# Patient Record
Sex: FEMALE | Race: WHITE | Employment: FULL TIME | URBAN - METROPOLITAN AREA
[De-identification: names, ages, dates, MRNs, and addresses within clinical notes are randomized per-mention and may not be internally consistent; named-entity substitution may affect disease eponyms.]

---

## 2017-01-18 ENCOUNTER — OFFICE VISIT (OUTPATIENT)
Dept: OBGYN CLINIC | Facility: CLINIC | Age: 37
End: 2017-01-18

## 2017-01-18 ENCOUNTER — LAB ENCOUNTER (OUTPATIENT)
Dept: LAB | Age: 37
End: 2017-01-18
Attending: OBSTETRICS & GYNECOLOGY
Payer: COMMERCIAL

## 2017-01-18 VITALS — HEART RATE: 66 BPM | DIASTOLIC BLOOD PRESSURE: 74 MMHG | SYSTOLIC BLOOD PRESSURE: 114 MMHG

## 2017-01-18 DIAGNOSIS — R30.0 DYSURIA: ICD-10-CM

## 2017-01-18 DIAGNOSIS — R30.0 DYSURIA: Primary | ICD-10-CM

## 2017-01-18 LAB
BILIRUB UR QL: NEGATIVE
COLOR UR: YELLOW
GLUCOSE UR-MCNC: NEGATIVE MG/DL
HGB UR QL STRIP.AUTO: NEGATIVE
KETONES UR-MCNC: NEGATIVE MG/DL
NITRITE UR QL STRIP.AUTO: POSITIVE
PH UR: 7 [PH] (ref 5–8)
PROT UR-MCNC: NEGATIVE MG/DL
RBC #/AREA URNS AUTO: 6 /HPF
SP GR UR STRIP: 1.01 (ref 1–1.03)
UROBILINOGEN UR STRIP-ACNC: <2
VIT C UR-MCNC: 40 MG/DL
WBC #/AREA URNS AUTO: 42 /HPF

## 2017-01-18 PROCEDURE — 87077 CULTURE AEROBIC IDENTIFY: CPT

## 2017-01-18 PROCEDURE — 87186 SC STD MICRODIL/AGAR DIL: CPT

## 2017-01-18 PROCEDURE — 81001 URINALYSIS AUTO W/SCOPE: CPT

## 2017-01-18 PROCEDURE — 87086 URINE CULTURE/COLONY COUNT: CPT

## 2017-01-18 PROCEDURE — 99213 OFFICE O/P EST LOW 20 MIN: CPT | Performed by: OBSTETRICS & GYNECOLOGY

## 2017-01-18 NOTE — PROGRESS NOTES
Mery Irvin is a 39year old female  No LMP recorded. Patient is not currently having periods (Reason: IUD - Intrauterine Device). Patient presents with:  Gyn Problem: UTI  Patient presents today with symptoms of UTI.  She states she has had these s Control/ Protection: Mirena     Other Topics Concern    Caffeine Concern Yes    Comment: coffee 1 cup daily     Social History Narrative       MEDICATIONS:    Current outpatient prescriptions:   •  cetirizine 10 MG Oral Tab, Take 10 mg by mouth daily. , Dis

## 2017-01-19 ENCOUNTER — TELEPHONE (OUTPATIENT)
Dept: OBGYN CLINIC | Facility: CLINIC | Age: 37
End: 2017-01-19

## 2017-01-20 RX ORDER — NITROFURANTOIN 25; 75 MG/1; MG/1
100 CAPSULE ORAL 2 TIMES DAILY
Qty: 14 CAPSULE | Refills: 0 | Status: SHIPPED | OUTPATIENT
Start: 2017-01-20 | End: 2017-01-27

## 2017-01-20 NOTE — TELEPHONE ENCOUNTER
Pt informed of KCBs recs below and verbalized understanding. Pt instructed to call office if sx's persist after completing tx. Pt verbalized understanding. Pt instructed to push fluids as well.

## 2017-01-20 NOTE — TELEPHONE ENCOUNTER
Please let patient know that she has e. Coli uti and will need treatment. Please send eRx for Macrobid 100mg BID for 7 days.

## 2017-01-21 ENCOUNTER — HOSPITAL ENCOUNTER (OUTPATIENT)
Age: 37
Discharge: HOME | End: 2017-01-21
Attending: FAMILY MEDICINE
Payer: COMMERCIAL

## 2017-01-21 VITALS
HEART RATE: 94 BPM | HEIGHT: 63 IN | SYSTOLIC BLOOD PRESSURE: 117 MMHG | DIASTOLIC BLOOD PRESSURE: 82 MMHG | WEIGHT: 140 LBS | RESPIRATION RATE: 16 BRPM | TEMPERATURE: 98 F | OXYGEN SATURATION: 96 % | BODY MASS INDEX: 24.8 KG/M2

## 2017-01-21 DIAGNOSIS — J01.00 ACUTE NON-RECURRENT MAXILLARY SINUSITIS: Primary | ICD-10-CM

## 2017-01-21 PROCEDURE — 99204 OFFICE O/P NEW MOD 45 MIN: CPT

## 2017-01-21 PROCEDURE — 99213 OFFICE O/P EST LOW 20 MIN: CPT

## 2017-01-21 RX ORDER — AMOXICILLIN AND CLAVULANATE POTASSIUM 875; 125 MG/1; MG/1
1 TABLET, FILM COATED ORAL 2 TIMES DAILY
Qty: 20 TABLET | Refills: 0 | Status: SHIPPED | OUTPATIENT
Start: 2017-01-21 | End: 2017-01-31

## 2017-01-21 NOTE — ED PROVIDER NOTES
Patient Seen in: [facility] In [address] [place] [address]    History   Patient presents with:  Cough/URI  Ear Problem Pain (neurosensory)    Stated Complaint: ear pain    HPI    Patient here with nasal congestion and cough for about 5 days .     Does ha occ      Review of Systems    Positive for stated complaint: ear pain  Other systems are as noted in HPI. Constitutional and vital signs reviewed. All other systems reviewed and negative except as noted above.     PSFH elements reviewed from today and to the ER.       Disposition and Plan     Clinical Impression:  Acute non-recurrent maxillary sinusitis  (primary encounter diagnosis)    Disposition:  Discharge    Follow-up:  Lisette Byrne DO  [id number] [phone]

## 2018-02-05 ENCOUNTER — OFFICE VISIT (OUTPATIENT)
Dept: OBGYN CLINIC | Facility: CLINIC | Age: 38
End: 2018-02-05

## 2018-02-05 VITALS
DIASTOLIC BLOOD PRESSURE: 80 MMHG | BODY MASS INDEX: 24 KG/M2 | HEART RATE: 72 BPM | SYSTOLIC BLOOD PRESSURE: 121 MMHG | WEIGHT: 137.63 LBS

## 2018-02-05 DIAGNOSIS — Z01.419 ENCOUNTER FOR GYNECOLOGICAL EXAMINATION: Primary | ICD-10-CM

## 2018-02-05 PROCEDURE — 99395 PREV VISIT EST AGE 18-39: CPT | Performed by: OBSTETRICS & GYNECOLOGY

## 2018-02-05 NOTE — PROGRESS NOTES
Faby Sheets is a 44year old female  No LMP recorded. Patient is not currently having periods (Reason: IUD - Intrauterine Device). here for annual exam.       Last seen  with pregnancy. Had Mirena IUD placed in 2014 with [facility].  No periods. denies blurred or double vision  Cardiovascular:  denies chest pain or palpitations  Respiratory:  denies shortness of breath  Gastrointestinal:  denies heartburn, abdominal pain, diarrhea or constipation  Genitourinary:  denies dysuria, incontinence, abno gynecological examination  Pap not done. ASCCP guidelines reviewed.    Encouraged annual exam.  RTC 1 year or prn      No prescriptions requested or ordered in this encounter      Irina Roper MD  2/5/2018  4:52 PM

## 2018-06-22 ENCOUNTER — TELEPHONE (OUTPATIENT)
Dept: OBGYN CLINIC | Facility: CLINIC | Age: 38
End: 2018-06-22

## 2018-06-22 RX ORDER — SULFAMETHOXAZOLE AND TRIMETHOPRIM 800; 160 MG/1; MG/1
1 TABLET ORAL 2 TIMES DAILY
Qty: 6 TABLET | Refills: 0 | Status: SHIPPED | OUTPATIENT
Start: 2018-06-22 | End: 2018-07-02

## 2018-06-22 NOTE — TELEPHONE ENCOUNTER
ON CALL--  Spoke with pt. In [place]. Burning with urination and increased frequency for a couple of days. Very uncomfortable.    Had UTI in [place]  Bactrim DS bid x 3 days efaxed to [facility] in [address], [place]

## 2018-10-22 ENCOUNTER — OFFICE VISIT (OUTPATIENT)
Dept: FAMILY MEDICINE CLINIC | Facility: CLINIC | Age: 38
End: 2018-10-22

## 2018-10-22 ENCOUNTER — NURSE TRIAGE (OUTPATIENT)
Dept: OTHER | Age: 38
End: 2018-10-22

## 2018-10-22 VITALS
BODY MASS INDEX: 21.79 KG/M2 | TEMPERATURE: 98 F | RESPIRATION RATE: 15 BRPM | SYSTOLIC BLOOD PRESSURE: 127 MMHG | WEIGHT: 123 LBS | HEART RATE: 67 BPM | DIASTOLIC BLOOD PRESSURE: 86 MMHG | HEIGHT: 63 IN

## 2018-10-22 DIAGNOSIS — J06.9 URI, ACUTE: Primary | ICD-10-CM

## 2018-10-22 DIAGNOSIS — R05.9 COUGH: ICD-10-CM

## 2018-10-22 PROCEDURE — 99212 OFFICE O/P EST SF 10 MIN: CPT | Performed by: FAMILY MEDICINE

## 2018-10-22 PROCEDURE — 99213 OFFICE O/P EST LOW 20 MIN: CPT | Performed by: FAMILY MEDICINE

## 2018-10-22 RX ORDER — FLUTICASONE PROPIONATE AND SALMETEROL 100; 50 UG/1; UG/1
1 POWDER RESPIRATORY (INHALATION) 2 TIMES DAILY
Qty: 1 PACKAGE | Refills: 1 | Status: SHIPPED | OUTPATIENT
Start: 2018-10-22 | End: 2019-10-17

## 2018-10-22 NOTE — TELEPHONE ENCOUNTER
Action Requested: Summary for Provider     []  Critical Lab, Recommendations Needed  [] Need Additional Advice  [x]   FYI    []   Need Orders  [] Need Medications Sent to Pharmacy  []  Other     SUMMARY: dry cough, runny/stuffy nose, and headache for over

## 2018-10-22 NOTE — PROGRESS NOTES
HPI:   Mirna Roldan is a 43year old female who presents for upper respiratory symptoms for  1  weeks. dry cough, runny/stuffy nose, and headache for over 1 week. Chest hurts when coughing. No fevers. Not short of breath or wheezing.  No other symptoms   T (36.7 °C) (Oral)   Resp 15   Ht 5' 3\" (1.6 m)   Wt 123 lb (55.8 kg)   BMI 21.79 kg/m²   GENERAL: well developed, well nourished,in no apparent distress  SKIN: no rashes  EYES:conjunctiva are clear  HEENT: atraumatic, normocephalic,ears and throat are gerardo

## 2018-11-14 ENCOUNTER — TELEPHONE (OUTPATIENT)
Dept: OBGYN CLINIC | Facility: CLINIC | Age: 38
End: 2018-11-14

## 2018-11-14 ENCOUNTER — OFFICE VISIT (OUTPATIENT)
Dept: OBGYN CLINIC | Facility: CLINIC | Age: 38
End: 2018-11-14

## 2018-11-14 VITALS — DIASTOLIC BLOOD PRESSURE: 82 MMHG | HEART RATE: 62 BPM | SYSTOLIC BLOOD PRESSURE: 127 MMHG

## 2018-11-14 DIAGNOSIS — N90.89 VULVAR IRRITATION: Primary | ICD-10-CM

## 2018-11-14 PROCEDURE — 99213 OFFICE O/P EST LOW 20 MIN: CPT | Performed by: OBSTETRICS & GYNECOLOGY

## 2018-11-14 RX ORDER — NYSTATIN AND TRIAMCINOLONE ACETONIDE 100000; 1 [USP'U]/G; MG/G
1 OINTMENT TOPICAL 2 TIMES DAILY
Qty: 1 TUBE | Refills: 0 | Status: SHIPPED | OUTPATIENT
Start: 2018-11-14 | End: 2021-06-28

## 2018-11-14 NOTE — TELEPHONE ENCOUNTER
PER PT STATE THE MEDICATION THAT WAS PRESCRIBE FOR HER IS NOT COVERED THRU HER INSURANCE / PT WANT TO KNOW IF THERE'S AN ALTERNATIVE MEDICATION / PLS ADV

## 2018-11-14 NOTE — TELEPHONE ENCOUNTER
Message to [facility] to see if there is an alternative for nystatin triamcinolone.

## 2018-11-14 NOTE — PROGRESS NOTES
Irene Mckeon is a 43year old female  No LMP recorded. Patient is not currently having periods (Reason: IUD - Intrauterine Device). Patient presents with:  Gyn Problem: VAGINAL IRRITATION    Patient presents today with vulvar irritation.  She states since quittin.4      Smokeless tobacco: Never Used      Tobacco comment: quit 2006    Substance and Sexual Activity      Alcohol use: Yes        Comment: OCC      Drug use: No      Sexual activity: Yes        Partners: Male        Birth control/protec Urethral Meatus:  normal in size, location, without lesions and prolapse  Bladder:  No fullness, masses or tenderness  Vagina:  Normal appearance without lesions, no abnormal discharge  Cervix:  Normal without lesions  Perineum: normal      Assessment &

## 2018-11-15 NOTE — TELEPHONE ENCOUNTER
Patient calling stating pharmacy has not gotten alternative patient would like to get response before end of day

## 2018-11-15 NOTE — TELEPHONE ENCOUNTER
KCB pt. Pharmacist stating that Nystatin-Triamcinolone 402376 - 0.1 uni/GM % external not covered. Pharmacist stated that Nystatin  And Triamcinolone separately would be covered. Discussed with KHUSHI and stated ok to send separately.  Placed a rx with

## 2019-08-20 ENCOUNTER — TELEPHONE (OUTPATIENT)
Dept: OBGYN CLINIC | Facility: CLINIC | Age: 39
End: 2019-08-20

## 2019-08-20 NOTE — TELEPHONE ENCOUNTER
Per [facility] office visit notes from pts last annual in 2/2018, \"Had Mirena IUD placed in 11/2014 with KMD\". Pt informed she is due for IUD exchange this November.  Pt also informed that she is overdue for an annual exam and this will need to be done prior to

## 2019-10-09 ENCOUNTER — OFFICE VISIT (OUTPATIENT)
Dept: OBGYN CLINIC | Facility: CLINIC | Age: 39
End: 2019-10-09

## 2019-10-09 VITALS
BODY MASS INDEX: 21.87 KG/M2 | DIASTOLIC BLOOD PRESSURE: 75 MMHG | HEIGHT: 63.5 IN | SYSTOLIC BLOOD PRESSURE: 114 MMHG | HEART RATE: 70 BPM | WEIGHT: 125 LBS

## 2019-10-09 DIAGNOSIS — Z12.31 ENCOUNTER FOR SCREENING MAMMOGRAM FOR BREAST CANCER: ICD-10-CM

## 2019-10-09 DIAGNOSIS — Z12.4 CERVICAL CANCER SCREENING: ICD-10-CM

## 2019-10-09 DIAGNOSIS — Z01.419 ENCOUNTER FOR GYNECOLOGICAL EXAMINATION WITHOUT ABNORMAL FINDING: Primary | ICD-10-CM

## 2019-10-09 DIAGNOSIS — Z30.431 IUD CHECK UP: ICD-10-CM

## 2019-10-09 PROCEDURE — 99396 PREV VISIT EST AGE 40-64: CPT | Performed by: OBSTETRICS & GYNECOLOGY

## 2019-10-09 NOTE — PROGRESS NOTES
Well Woman Exam    HPI:  The patient is a 43yo female who presents today for annual exam. She has no complaints. She is doing well. She has no menses with Mirena IUD that is due out Nov 2019. Pt would like another Mirena IUD.  She is 39 and reviewed need fo Substance and Sexual Activity      Alcohol use: Yes        Comment: OCC      Drug use: No      Sexual activity: Yes        Partners: Male        Birth control/protection: Mirena, IUD    Lifestyle      Physical activity:        Days per week: Not on file Disp: 1 Tube, Rfl: 0  •  fluticasone-salmeterol (ADVAIR DISKUS) 100-50 MCG/DOSE Inhalation Aerosol Powder, Breath Activated, Inhale 1 puff into the lungs 2 (two) times daily. , Disp: 1 Package, Rfl: 1  •  cetirizine 10 MG Oral Tab, Take 10 mg by mouth daily tenderness  Perineum: normal    Assessment/Plan:  1. WWE:   1. Reviewed ASCCP guidelines with the patient   2. Cotesting today  3. Negative cotesting in 2016  2. Contraception:   1. Mirena IUD  2.  Plan for removal and reinsertion as expires in Nov 2019   3

## 2019-11-06 ENCOUNTER — HOSPITAL ENCOUNTER (OUTPATIENT)
Dept: MAMMOGRAPHY | Age: 39
Discharge: HOME | End: 2019-11-06
Attending: OBSTETRICS & GYNECOLOGY
Payer: COMMERCIAL

## 2019-11-06 DIAGNOSIS — Z12.31 ENCOUNTER FOR SCREENING MAMMOGRAM FOR BREAST CANCER: ICD-10-CM

## 2019-11-06 PROCEDURE — 77063 BREAST TOMOSYNTHESIS BI: CPT | Performed by: OBSTETRICS & GYNECOLOGY

## 2019-11-06 PROCEDURE — 77067 SCR MAMMO BI INCL CAD: CPT | Performed by: OBSTETRICS & GYNECOLOGY

## 2019-11-11 ENCOUNTER — OFFICE VISIT (OUTPATIENT)
Dept: OBGYN CLINIC | Facility: CLINIC | Age: 39
End: 2019-11-11

## 2019-11-11 VITALS
BODY MASS INDEX: 22 KG/M2 | SYSTOLIC BLOOD PRESSURE: 120 MMHG | WEIGHT: 125.63 LBS | HEART RATE: 66 BPM | DIASTOLIC BLOOD PRESSURE: 79 MMHG

## 2019-11-11 DIAGNOSIS — Z32.00 PREGNANCY EXAMINATION OR TEST, PREGNANCY UNCONFIRMED: Primary | ICD-10-CM

## 2019-11-11 DIAGNOSIS — Z30.433 ENCOUNTER FOR REMOVAL AND REINSERTION OF INTRAUTERINE CONTRACEPTIVE DEVICE: ICD-10-CM

## 2019-11-11 PROCEDURE — 58300 INSERT INTRAUTERINE DEVICE: CPT | Performed by: OBSTETRICS & GYNECOLOGY

## 2019-11-11 PROCEDURE — 81025 URINE PREGNANCY TEST: CPT | Performed by: OBSTETRICS & GYNECOLOGY

## 2019-11-11 NOTE — PROCEDURES
IUD Removal and Insertion      Pregnancy Results: negative from urine test   Birth control method(s) used: Mirena  LMP: None  Consent signed.   Procedure discussed with the patient in detail including indication, risks, benefits, alternatives and complicati

## 2019-12-09 ENCOUNTER — TELEPHONE (OUTPATIENT)
Dept: OBGYN CLINIC | Facility: CLINIC | Age: 39
End: 2019-12-09

## 2019-12-09 NOTE — TELEPHONE ENCOUNTER
Pt had IUD insertion about 4 weeks ago, this past week pt having severe abd pain and lower back pain. Has f/u appt on 12/18 but not sure if she should be seen sooner or if that's the reason for the pain.  pls adv further

## 2019-12-09 NOTE — TELEPHONE ENCOUNTER
C/O HAD MIRENA IUD PLACED ON 11-11-19. STATES SHE HAD SOME CRAMPING FOR THE FIRST WEEK THEN DID RESOLVE COMPLETELY.   NOW FOR PAST COUPLE WEEKS IS HAVING LOW ABDOMINAL PAIN (ALL ACROSS BOTTOM OF ABDOMEN) AND LOW BACK PAIN THAT STARTS IN THE EVENING AND RIO

## 2019-12-10 NOTE — TELEPHONE ENCOUNTER
Recommend Ibuprofen 600mg q6 hours as needed and heating pad. If pain is severe can go to ER. Cramping is often more sharp with IUD in place.  I will see pt on 12/18 or if I have an opening (20 minutes) can add her earlier

## 2019-12-10 NOTE — TELEPHONE ENCOUNTER
Informed pt of KCB recs below. Offered pt first available 20 min appt for 12/17/19 pt declined due to appt is only 1 day sooner then appt scheduled. Pt added to [facility] wait list for 20 min appt. Pt verbalized understanding.

## 2019-12-10 NOTE — TELEPHONE ENCOUNTER
Pt calling to f/u about message below. Informed pt Aristides Avila has not had a chance to respond yet as pt called after KCB left and Aristides Avila is not in clinic until this afternoon.  Pt reports after she spoke to nurse yesterday she again had constant lower abdominal pain

## 2019-12-18 ENCOUNTER — OFFICE VISIT (OUTPATIENT)
Dept: OBGYN CLINIC | Facility: CLINIC | Age: 39
End: 2019-12-18

## 2019-12-18 VITALS
WEIGHT: 122 LBS | SYSTOLIC BLOOD PRESSURE: 135 MMHG | DIASTOLIC BLOOD PRESSURE: 86 MMHG | BODY MASS INDEX: 21 KG/M2 | HEART RATE: 62 BPM

## 2019-12-18 DIAGNOSIS — R10.2 PELVIC PAIN IN FEMALE: Primary | ICD-10-CM

## 2019-12-18 DIAGNOSIS — Z30.431 IUD CHECK UP: ICD-10-CM

## 2019-12-18 PROCEDURE — 99213 OFFICE O/P EST LOW 20 MIN: CPT | Performed by: OBSTETRICS & GYNECOLOGY

## 2019-12-18 NOTE — PROGRESS NOTES
Erwin Reed is a 35year old female  No LMP recorded. (Menstrual status: IUD - Intrauterine Device). Patient presents with:  Gyn Exam: IUD check     The patient is a 41yo female who presents after Mirena IUD insertion on .  She states she wa Used      Tobacco comment: quit 2006    Substance and Sexual Activity      Alcohol use: Yes        Comment: OCC      Drug use: No      Sexual activity: Yes        Partners: Male        Birth control/protection: Mirena, I.U.D.     Lifestyle      Physical act breath  Gastrointestinal:  denies nausea, vomiting, diarrhea   Genitourinary:  denies dysuria, incontinence  Heme: Denies easy bruising   Skin/Breast:  Denies any breast pain, lumps, or discharge.    Neurological:  denies headaches, blurred or double vision Call if pain continues. Will call after US results are returned.

## 2020-01-08 ENCOUNTER — HOSPITAL ENCOUNTER (OUTPATIENT)
Dept: ULTRASOUND IMAGING | Age: 40
Discharge: HOME | End: 2020-01-08
Attending: OBSTETRICS & GYNECOLOGY
Payer: COMMERCIAL

## 2020-01-08 DIAGNOSIS — R10.2 PELVIC PAIN IN FEMALE: ICD-10-CM

## 2020-01-08 DIAGNOSIS — Z30.431 IUD CHECK UP: ICD-10-CM

## 2020-01-08 PROCEDURE — 76830 TRANSVAGINAL US NON-OB: CPT | Performed by: OBSTETRICS & GYNECOLOGY

## 2020-01-08 PROCEDURE — 76856 US EXAM PELVIC COMPLETE: CPT | Performed by: OBSTETRICS & GYNECOLOGY

## 2020-01-16 ENCOUNTER — TELEPHONE (OUTPATIENT)
Dept: OBGYN CLINIC | Facility: CLINIC | Age: 40
End: 2020-01-16

## 2020-01-16 DIAGNOSIS — N83.201 RIGHT OVARIAN CYST: Primary | ICD-10-CM

## 2020-01-16 NOTE — TELEPHONE ENCOUNTER
Pt advised of below. States does not receive cycles. Informed will clarify with KCB on when to repeat US and let her know. States understanding.

## 2020-01-16 NOTE — TELEPHONE ENCOUNTER
----- Message from Dylan Titus MD sent at 1/15/2020  1:45 PM CST -----  Please let patient know that [address] shows IUD in place. She had a very small 2cm right ovarian cyst. I recommend we repeat and US in 8 weeks after her next menses (if she gets one).  P

## 2020-01-20 NOTE — TELEPHONE ENCOUNTER
Informed pt that [address] stated 8 weeks from last [address]. Pt stated understanding. Pt give phone number to schedule it.

## 2020-03-05 ENCOUNTER — HOSPITAL ENCOUNTER (OUTPATIENT)
Dept: ULTRASOUND IMAGING | Age: 40
Discharge: HOME | End: 2020-03-05
Attending: OBSTETRICS & GYNECOLOGY
Payer: COMMERCIAL

## 2020-03-05 DIAGNOSIS — N83.201 RIGHT OVARIAN CYST: ICD-10-CM

## 2020-03-05 PROCEDURE — 76830 TRANSVAGINAL US NON-OB: CPT | Performed by: OBSTETRICS & GYNECOLOGY

## 2020-03-05 PROCEDURE — 76856 US EXAM PELVIC COMPLETE: CPT | Performed by: OBSTETRICS & GYNECOLOGY

## 2020-03-10 ENCOUNTER — TELEPHONE (OUTPATIENT)
Dept: OBGYN CLINIC | Facility: CLINIC | Age: 40
End: 2020-03-10

## 2020-03-10 NOTE — TELEPHONE ENCOUNTER
Left message for patient to go over ultrasound results and position of IUD. Pt to call back and review recommendations and risks with IUD in lower uterine segment.

## 2020-03-13 NOTE — TELEPHONE ENCOUNTER
Returned phone call. Pt doing well and no pain. Reviewed US findings. Reviewed IUD is lower in the uterus. Reviewed options of removal, removing it and replacing it, or leaving it in place.  Reviewed Up to date data with her and that it states it is ok for

## 2021-01-19 ENCOUNTER — NURSE TRIAGE (OUTPATIENT)
Dept: FAMILY MEDICINE CLINIC | Facility: CLINIC | Age: 41
End: 2021-01-19

## 2021-01-19 ENCOUNTER — TELEMEDICINE (OUTPATIENT)
Dept: FAMILY MEDICINE CLINIC | Facility: CLINIC | Age: 41
End: 2021-01-19

## 2021-01-19 DIAGNOSIS — Z12.31 SCREENING MAMMOGRAM, ENCOUNTER FOR: Primary | ICD-10-CM

## 2021-01-19 PROCEDURE — 99213 OFFICE O/P EST LOW 20 MIN: CPT | Performed by: FAMILY MEDICINE

## 2021-01-19 RX ORDER — AMOXICILLIN 500 MG/1
500 CAPSULE ORAL 3 TIMES DAILY
Qty: 30 CAPSULE | Refills: 0 | Status: SHIPPED | OUTPATIENT
Start: 2021-01-19 | End: 2021-01-29

## 2021-01-19 NOTE — PROGRESS NOTES
VIDEO VISIT    Patient presents today complaining of a 1 week history of toothache pressure and left ear pain. Also clear nasal congestion. Advil for ear pain. The ear pain did keep her awake last night denies fever no anosmia no ageusia.   She works fro

## 2021-01-19 NOTE — TELEPHONE ENCOUNTER
Action Requested: Summary for Provider     []  Critical Lab, Recommendations Needed  [] Need Additional Advice  []   FYI    []   Need Orders  [] Need Medications Sent to Pharmacy  []  Other     SUMMARY: Doximity scheduled with PCP today.      Reason for munira

## 2021-02-08 ENCOUNTER — OFFICE VISIT (OUTPATIENT)
Dept: FAMILY MEDICINE CLINIC | Facility: CLINIC | Age: 41
End: 2021-02-08

## 2021-02-08 VITALS
WEIGHT: 143.19 LBS | HEART RATE: 62 BPM | BODY MASS INDEX: 25.37 KG/M2 | DIASTOLIC BLOOD PRESSURE: 77 MMHG | SYSTOLIC BLOOD PRESSURE: 117 MMHG | HEIGHT: 63 IN

## 2021-02-08 DIAGNOSIS — S03.40XA SPRAIN OF TEMPOROMANDIBULAR JOINT, INITIAL ENCOUNTER: Primary | ICD-10-CM

## 2021-02-08 PROCEDURE — 3008F BODY MASS INDEX DOCD: CPT | Performed by: FAMILY MEDICINE

## 2021-02-08 PROCEDURE — 3074F SYST BP LT 130 MM HG: CPT | Performed by: FAMILY MEDICINE

## 2021-02-08 PROCEDURE — 99213 OFFICE O/P EST LOW 20 MIN: CPT | Performed by: FAMILY MEDICINE

## 2021-02-08 PROCEDURE — 3078F DIAST BP <80 MM HG: CPT | Performed by: FAMILY MEDICINE

## 2021-02-08 RX ORDER — FLUTICASONE PROPIONATE 50 MCG
2 SPRAY, SUSPENSION (ML) NASAL DAILY
Qty: 1 BOTTLE | Refills: 3 | Status: SHIPPED | OUTPATIENT
Start: 2021-02-08 | End: 2021-06-08

## 2021-02-08 NOTE — PATIENT INSTRUCTIONS
Pain Relief Methods for Temporomandibular Disorders (TMD)  You have been diagnosed with temporomandibular disorder (TMD). This term describes a group of problems linked to the temporomandibular joint (TMJ)and nearby jaw muscles.  The TMJ is located where A trigger point is a painful spot in a tight muscle. It is often painful to the touch and may refer pain to other places. Your healthcare provider can focus on trigger points using:   · Massage. This can be done both inside and outside the mouth.  This rela © 3318-4558 [employer]. All rights reserved. This information is not intended as a substitute for professional medical care. Always follow your healthcare professional's instructions.         Understanding Temporomandibular Disorders (TMD) Treatment can help relieve your current condition. But TMD symptoms may return over time. You may prevent future problems by maintaining the health of your jaw:   · Stay away from foods and habits that make your symptoms worse.   · Lower the stress level in The muscles surrounding the TMJ can tighten (spasm) and cause pain. · Referred pain. This happens in a part of the body separate from the source of the problem. For example, pain in the face or teeth could be coming from a problem in the TMJ.   · Myofascia © 6044-9468 [employer] [address]. All rights reserved. This information is not intended as a substitute for professional medical care. Always follow your healthcare professional's instructions.

## 2021-02-08 NOTE — PROGRESS NOTES
Blood pressure 117/77, pulse 62, height 5' 3\" (1.6 m), weight 143 lb 3.2 oz (65 kg), not currently breastfeeding. Presents today complaining of fullness in the left ear. Reports that her jaw clicks and cracks.   She otherwise reports that she does not

## 2021-02-19 ENCOUNTER — HOSPITAL ENCOUNTER (OUTPATIENT)
Dept: MAMMOGRAPHY | Age: 41
Discharge: HOME | End: 2021-02-19
Attending: FAMILY MEDICINE
Payer: COMMERCIAL

## 2021-02-19 DIAGNOSIS — Z12.31 SCREENING MAMMOGRAM, ENCOUNTER FOR: ICD-10-CM

## 2021-02-19 PROCEDURE — 77063 BREAST TOMOSYNTHESIS BI: CPT | Performed by: FAMILY MEDICINE

## 2021-02-19 PROCEDURE — 77067 SCR MAMMO BI INCL CAD: CPT | Performed by: FAMILY MEDICINE

## 2021-05-13 ENCOUNTER — LAB ENCOUNTER (OUTPATIENT)
Dept: LAB | Age: 41
End: 2021-05-13
Attending: FAMILY MEDICINE
Payer: COMMERCIAL

## 2021-05-13 DIAGNOSIS — S03.40XA SPRAIN OF TEMPOROMANDIBULAR JOINT, INITIAL ENCOUNTER: ICD-10-CM

## 2021-05-13 PROCEDURE — 84443 ASSAY THYROID STIM HORMONE: CPT

## 2021-05-13 PROCEDURE — 36415 COLL VENOUS BLD VENIPUNCTURE: CPT

## 2021-05-13 PROCEDURE — 80061 LIPID PANEL: CPT

## 2021-05-13 PROCEDURE — 82947 ASSAY GLUCOSE BLOOD QUANT: CPT

## 2021-06-23 ENCOUNTER — NURSE TRIAGE (OUTPATIENT)
Dept: FAMILY MEDICINE CLINIC | Facility: CLINIC | Age: 41
End: 2021-06-23

## 2021-06-23 NOTE — TELEPHONE ENCOUNTER
Action Requested: Summary for Provider     []  Critical Lab, Recommendations Needed  [] Need Additional Advice  []   FYI    []   Need Orders  [] Need Medications Sent to Pharmacy  []  Other     SUMMARY: Per protocol advised office visit.   Scheduled with

## 2021-06-25 ENCOUNTER — TELEPHONE (OUTPATIENT)
Dept: FAMILY MEDICINE CLINIC | Facility: CLINIC | Age: 41
End: 2021-06-25

## 2021-06-25 ENCOUNTER — OFFICE VISIT (OUTPATIENT)
Dept: FAMILY MEDICINE CLINIC | Facility: CLINIC | Age: 41
End: 2021-06-25

## 2021-06-25 VITALS
HEART RATE: 75 BPM | BODY MASS INDEX: 24.98 KG/M2 | WEIGHT: 141 LBS | HEIGHT: 63 IN | SYSTOLIC BLOOD PRESSURE: 122 MMHG | DIASTOLIC BLOOD PRESSURE: 81 MMHG

## 2021-06-25 DIAGNOSIS — M62.830 SPASM OF MUSCLE OF LOWER BACK: Primary | ICD-10-CM

## 2021-06-25 PROCEDURE — 99213 OFFICE O/P EST LOW 20 MIN: CPT | Performed by: FAMILY MEDICINE

## 2021-06-25 PROCEDURE — 3008F BODY MASS INDEX DOCD: CPT | Performed by: FAMILY MEDICINE

## 2021-06-25 PROCEDURE — 3074F SYST BP LT 130 MM HG: CPT | Performed by: FAMILY MEDICINE

## 2021-06-25 PROCEDURE — 3079F DIAST BP 80-89 MM HG: CPT | Performed by: FAMILY MEDICINE

## 2021-06-25 RX ORDER — HYDROCODONE BITARTRATE AND ACETAMINOPHEN 10; 325 MG/1; MG/1
1 TABLET ORAL EVERY 6 HOURS PRN
Qty: 30 TABLET | Refills: 0 | Status: SHIPPED | OUTPATIENT
Start: 2021-06-25 | End: 2021-06-28

## 2021-06-25 RX ORDER — METHYLPREDNISOLONE 4 MG/1
TABLET ORAL
Qty: 1 EACH | Refills: 1 | Status: SHIPPED | OUTPATIENT
Start: 2021-06-25 | End: 2021-07-09 | Stop reason: ALTCHOICE

## 2021-06-25 NOTE — PROGRESS NOTES
Blood pressure 122/81, pulse 75, height 5' 3\" (1.6 m), weight 141 lb (64 kg), not currently breastfeeding. Complaining of back spasms that began last Saturday 5 days ago while lifting a case of beer.   She felt better 2 days ago and then bent down and h

## 2021-06-25 NOTE — TELEPHONE ENCOUNTER
Prior authorization for [employer] completed w/ [employer] on [employer] Key: [id number], turn around time 1-5 days.

## 2021-06-25 NOTE — TELEPHONE ENCOUNTER
Prior authorization request for:    •  HYDROcodone-acetaminophen (NORCO)  MG Oral Tab, Take 1 tablet by mouth every 6 (six) hours as needed for Pain., Disp: 30 tablet, Rfl: 0    [employer]/login    Key: [id number]

## 2021-06-28 RX ORDER — HYDROCODONE BITARTRATE AND ACETAMINOPHEN 10; 325 MG/1; MG/1
1 TABLET ORAL EVERY 6 HOURS PRN
Qty: 20 TABLET | Refills: 0 | Status: SHIPPED | OUTPATIENT
Start: 2021-06-28 | End: 2021-07-09 | Stop reason: ALTCHOICE

## 2021-06-28 NOTE — TELEPHONE ENCOUNTER
Spoke to pharmacy, previous script was cancelled. New script processed successfully with a $0 out of pocket.

## 2021-06-28 NOTE — TELEPHONE ENCOUNTER
Prior authorization has been denied for [place]. Patients plan states medication is not covered due to quantity limitation. Plan covers only 7 day supply.

## 2021-07-09 ENCOUNTER — OFFICE VISIT (OUTPATIENT)
Dept: FAMILY MEDICINE CLINIC | Facility: CLINIC | Age: 41
End: 2021-07-09

## 2021-07-09 ENCOUNTER — HOSPITAL ENCOUNTER (OUTPATIENT)
Dept: GENERAL RADIOLOGY | Age: 41
Discharge: HOME | End: 2021-07-09
Attending: FAMILY MEDICINE
Payer: COMMERCIAL

## 2021-07-09 VITALS
BODY MASS INDEX: 24.93 KG/M2 | HEIGHT: 63 IN | WEIGHT: 140.69 LBS | RESPIRATION RATE: 17 BRPM | HEART RATE: 59 BPM | SYSTOLIC BLOOD PRESSURE: 118 MMHG | DIASTOLIC BLOOD PRESSURE: 78 MMHG

## 2021-07-09 DIAGNOSIS — M79.606 PAIN OF LOWER EXTREMITY, UNSPECIFIED LATERALITY: ICD-10-CM

## 2021-07-09 DIAGNOSIS — Z00.00 ROUTINE PHYSICAL EXAMINATION: Primary | ICD-10-CM

## 2021-07-09 DIAGNOSIS — Z80.8 FAMILY HISTORY OF MALIGNANT MELANOMA: ICD-10-CM

## 2021-07-09 PROCEDURE — 99396 PREV VISIT EST AGE 40-64: CPT | Performed by: FAMILY MEDICINE

## 2021-07-09 PROCEDURE — 90471 IMMUNIZATION ADMIN: CPT | Performed by: FAMILY MEDICINE

## 2021-07-09 PROCEDURE — 73630 X-RAY EXAM OF FOOT: CPT | Performed by: FAMILY MEDICINE

## 2021-07-09 PROCEDURE — 3078F DIAST BP <80 MM HG: CPT | Performed by: FAMILY MEDICINE

## 2021-07-09 PROCEDURE — 3074F SYST BP LT 130 MM HG: CPT | Performed by: FAMILY MEDICINE

## 2021-07-09 PROCEDURE — 90715 TDAP VACCINE 7 YRS/> IM: CPT | Performed by: FAMILY MEDICINE

## 2021-07-09 PROCEDURE — 3008F BODY MASS INDEX DOCD: CPT | Performed by: FAMILY MEDICINE

## 2021-07-09 RX ORDER — CETIRIZINE HYDROCHLORIDE 5 MG/1
5 TABLET ORAL DAILY
COMMUNITY

## 2021-07-09 NOTE — PROGRESS NOTES
REASON FOR VISIT:    Laura Dixon is a 42year old female who presents for an [address]. Presents today complaining of left foot pain for 1 month on the dorsum of the foot. She is active exercising doing Pilates.       Patient Active Proble Current Outpatient Medications   Medication Sig Dispense Refill   • Cetirizine HCl 5 MG Oral Tab Take 5 mg by mouth daily. • ibuprofen 100 MG/5ML Oral Suspension Take 200 mg by mouth every 4 (four) hours as needed for Fever.  (Patient not taking: Repo depression or anxiety  HEMATOLOGIC: denies hx of anemia  ENDOCRINE: denies thyroid history  ALL/ASTHMA: denies hx of allergy or asthma  NO BLOOD IN STOOL      EXAM:   /78   Pulse 59   Resp 17   Ht 5' 3\" (1.6 m)   Wt 140 lb 11.2 oz (63.8 kg)   BMI 24 years No recommendations at this time   Flex Sigmoidoscopy Screen  Every 5 years No results found for this or any previous visit.    Fecal Occult Blood  Annually No results found for: FOB, OCCULTSTOOL   Obesity Screening Screen all adults annually Body mass Problem Relation Age of Onset   • Breast Cancer Paternal Aunt 54         from complications of breast cancer   • Breast Cancer Paternal Aunt 54         from complications of breast cancer   • Hypertension Mother    • Breast Cancer Mother 71   • D are clear.     NECK: supple, no adenopathy, no bruits  CHEST: no chest tenderness  BREAST: deferred   LUNGS: clear to auscultation  CARDIO: RRR without murmur  GI: good BS's, no masses, HSM or tenderness  :deferred  RECTAL: deferred  MUSCULOSKELETAL: back if not immune   MMR 1-2 doses if born after 1957 and not immune     1. Routine physical examination  Tetanus vaccine today refusing Covid vaccine    2.  Pain of lower extremity, unspecified laterality  We will consider MRI if x-ray negative  - XR FOOT, COMP

## 2021-08-12 ENCOUNTER — OFFICE VISIT (OUTPATIENT)
Dept: DERMATOLOGY CLINIC | Facility: CLINIC | Age: 41
End: 2021-08-12

## 2021-08-12 DIAGNOSIS — L81.4 LENTIGO: Primary | ICD-10-CM

## 2021-08-12 DIAGNOSIS — L81.1 MELASMA: ICD-10-CM

## 2021-08-12 DIAGNOSIS — D22.9 MULTIPLE NEVI: ICD-10-CM

## 2021-08-12 DIAGNOSIS — D23.9 BENIGN NEOPLASM OF SKIN, UNSPECIFIED LOCATION: ICD-10-CM

## 2021-08-12 DIAGNOSIS — Z80.8 FAMILY HISTORY OF MELANOMA: ICD-10-CM

## 2021-08-12 PROCEDURE — 99203 OFFICE O/P NEW LOW 30 MIN: CPT | Performed by: DERMATOLOGY

## 2021-08-22 NOTE — PROGRESS NOTES
Wilfrid Officer is a 42year old female. HPI:     CC:  Patient presents with:  Derm Problem: New patient presents for full body check. Denies personal hx of skin ca. Mother with hx of melanoma. Allergies:  Patient has no known allergies.     HISTORY: (1 min) 9 (5 min)     Social History    Socioeconomic History      Marital status:       Spouse name: Not on file      Number of children: Not on file      Years of education: Not on file      Highest education level: Not on file    Occupational His Feeling of Stress :   Social Connections:       Frequency of Communication with Friends and Family:       Frequency of Social Gatherings with Friends and Family:       Attends Jehov[employer] witness Services:       Active Member of Clubs or Organizations:       Attends chest,/ breasts, axillae,  abdomen, arms, legs, palms. Multiple light to medium brown, well marginated, uniformly pigmented, macules and papules 6 mm and less scattered on exam. pigmented lesions examined with dermoscopy benign-appearing patterns. if patient desires. May take several months to see benefit. Hydroquinone's should not be used for more than 12 weeks at a time due to increased risk of pigmentation from the drug itself depositing in the skin (leading to exogenous ochronosis).   Chronic r the patient on the day of the encounter: 30 minutes     The patient indicates understanding of these issues and agrees to the plan. The patient is asked to return as noted in follow-up/ above.     This note was generated using Dragon voice recognition soft

## 2021-12-02 ENCOUNTER — NURSE TRIAGE (OUTPATIENT)
Dept: FAMILY MEDICINE CLINIC | Facility: CLINIC | Age: 41
End: 2021-12-02

## 2021-12-02 NOTE — TELEPHONE ENCOUNTER
Action Requested: Summary for Provider     []  Critical Lab, Recommendations Needed  [] Need Additional Advice  []   FYI    []   Need Orders  [] Need Medications Sent to Pharmacy  []  Other     SUMMARY: pt reports that her  and daughter were COVID +

## 2021-12-03 ENCOUNTER — HOSPITAL ENCOUNTER (OUTPATIENT)
Age: 41
Discharge: HOME | End: 2021-12-03
Payer: COMMERCIAL

## 2021-12-03 VITALS
BODY MASS INDEX: 23.9 KG/M2 | SYSTOLIC BLOOD PRESSURE: 140 MMHG | RESPIRATION RATE: 18 BRPM | HEART RATE: 69 BPM | HEIGHT: 64 IN | WEIGHT: 140 LBS | DIASTOLIC BLOOD PRESSURE: 80 MMHG | OXYGEN SATURATION: 100 % | TEMPERATURE: 100 F

## 2021-12-03 DIAGNOSIS — U07.1 COVID-19: Primary | ICD-10-CM

## 2021-12-03 DIAGNOSIS — J02.0 STREP PHARYNGITIS: ICD-10-CM

## 2021-12-03 PROCEDURE — U0002 COVID-19 LAB TEST NON-CDC: HCPCS | Performed by: PHYSICIAN ASSISTANT

## 2021-12-03 PROCEDURE — 87880 STREP A ASSAY W/OPTIC: CPT | Performed by: PHYSICIAN ASSISTANT

## 2021-12-03 PROCEDURE — 99214 OFFICE O/P EST MOD 30 MIN: CPT | Performed by: PHYSICIAN ASSISTANT

## 2021-12-03 RX ORDER — AMOXICILLIN 500 MG/1
500 CAPSULE ORAL 2 TIMES DAILY
Qty: 20 CAPSULE | Refills: 0 | Status: SHIPPED | OUTPATIENT
Start: 2021-12-03 | End: 2021-12-13

## 2021-12-03 NOTE — ED PROVIDER NOTES
Patient Seen in: [facility]      History   Patient presents with:  Sore Throat    Stated Complaint: /daughter covid+/sorethroat    Subjective:   HPI    41 yo female here for evaluation of sore throat, fatigue and dry cough.   Pt states Mouth/Throat:      Mouth: Mucous membranes are moist.   Eyes:      Extraocular Movements: Extraocular movements intact. Pupils: Pupils are equal, round, and reactive to light. Cardiovascular:      Rate and Rhythm: Normal rate.    Pulmonary: mouth 2 (two) times daily for 10 days.   Qty: 20 capsule Refills: 0

## 2021-12-03 NOTE — ED INITIAL ASSESSMENT (HPI)
C/o sore throat and fatigue, dry cough. Daughter and  tested positive for covid last week, traveled to

## 2022-02-24 ENCOUNTER — ORDER TRANSCRIPTION (OUTPATIENT)
Dept: ADMINISTRATIVE | Facility: HOSPITAL | Age: 42
End: 2022-02-24

## 2022-03-17 ENCOUNTER — HOSPITAL ENCOUNTER (OUTPATIENT)
Dept: MAMMOGRAPHY | Age: 42
Discharge: HOME | End: 2022-03-17
Attending: FAMILY MEDICINE
Payer: COMMERCIAL

## 2022-03-17 DIAGNOSIS — Z12.31 ENCOUNTER FOR SCREENING MAMMOGRAM FOR MALIGNANT NEOPLASM OF BREAST: ICD-10-CM

## 2022-03-17 PROCEDURE — 77067 SCR MAMMO BI INCL CAD: CPT | Performed by: FAMILY MEDICINE

## 2022-03-17 PROCEDURE — 77063 BREAST TOMOSYNTHESIS BI: CPT | Performed by: FAMILY MEDICINE

## 2022-03-22 ENCOUNTER — TELEPHONE (OUTPATIENT)
Dept: FAMILY MEDICINE CLINIC | Facility: CLINIC | Age: 42
End: 2022-03-22

## 2022-03-24 ENCOUNTER — NURSE TRIAGE (OUTPATIENT)
Dept: FAMILY MEDICINE CLINIC | Facility: CLINIC | Age: 42
End: 2022-03-24

## 2022-03-24 ENCOUNTER — OFFICE VISIT (OUTPATIENT)
Dept: FAMILY MEDICINE CLINIC | Facility: CLINIC | Age: 42
End: 2022-03-24
Payer: COMMERCIAL

## 2022-03-24 VITALS
WEIGHT: 135 LBS | OXYGEN SATURATION: 97 % | TEMPERATURE: 99 F | BODY MASS INDEX: 23.92 KG/M2 | HEART RATE: 88 BPM | DIASTOLIC BLOOD PRESSURE: 60 MMHG | HEIGHT: 63 IN | SYSTOLIC BLOOD PRESSURE: 110 MMHG

## 2022-03-24 DIAGNOSIS — J02.9 SORE THROAT: Primary | ICD-10-CM

## 2022-03-24 LAB
CONTROL LINE PRESENT WITH A CLEAR BACKGROUND (YES/NO): YES YES/NO
KIT LOT #: NORMAL NUMERIC
STREP GRP A CUL-SCR: NEGATIVE

## 2022-03-24 PROCEDURE — 99213 OFFICE O/P EST LOW 20 MIN: CPT

## 2022-03-24 PROCEDURE — 3078F DIAST BP <80 MM HG: CPT

## 2022-03-24 PROCEDURE — 3074F SYST BP LT 130 MM HG: CPT

## 2022-03-24 PROCEDURE — 87880 STREP A ASSAY W/OPTIC: CPT

## 2022-03-24 PROCEDURE — 87081 CULTURE SCREEN ONLY: CPT

## 2022-03-24 PROCEDURE — 3008F BODY MASS INDEX DOCD: CPT

## 2022-03-24 RX ORDER — AMOXICILLIN 500 MG/1
500 TABLET, FILM COATED ORAL 2 TIMES DAILY
Qty: 20 TABLET | Refills: 0 | Status: SHIPPED | OUTPATIENT
Start: 2022-03-24 | End: 2022-04-03

## 2022-03-25 LAB — SARS-COV-2 RNA RESP QL NAA+PROBE: NOT DETECTED

## 2022-04-14 NOTE — TELEPHONE ENCOUNTER
Patient is requesting referral.     Name of specialist and specialty department : Carolyn Buchanan, dermatologist  Reason for visit with the specialist: she wanted to see her as a followup every six months  Address of the specialist office: [address] [place] [address].  Appointment date: She cannot make an appointment until they receive referral.     Patient says she called us last month or earlier for this referral, has not heard back anything. Her last visit with Dr. Narda Killian was 7-9-2021. Please call her. [facility] informed patient the turnaround time for referral is 5-7 business days. Patient was informed to check their [employer] account for referral status.

## 2022-04-21 ENCOUNTER — TELEPHONE (OUTPATIENT)
Dept: OBGYN CLINIC | Facility: CLINIC | Age: 42
End: 2022-04-21

## 2022-04-21 ENCOUNTER — NURSE TRIAGE (OUTPATIENT)
Dept: FAMILY MEDICINE CLINIC | Facility: CLINIC | Age: 42
End: 2022-04-21

## 2022-04-21 ENCOUNTER — OFFICE VISIT (OUTPATIENT)
Dept: FAMILY MEDICINE CLINIC | Facility: CLINIC | Age: 42
End: 2022-04-21
Payer: COMMERCIAL

## 2022-04-21 VITALS
HEART RATE: 67 BPM | SYSTOLIC BLOOD PRESSURE: 122 MMHG | HEIGHT: 63 IN | WEIGHT: 136 LBS | DIASTOLIC BLOOD PRESSURE: 81 MMHG | BODY MASS INDEX: 24.1 KG/M2

## 2022-04-21 DIAGNOSIS — R30.0 DYSURIA: ICD-10-CM

## 2022-04-21 DIAGNOSIS — N30.00 ACUTE CYSTITIS WITHOUT HEMATURIA: Primary | ICD-10-CM

## 2022-04-21 LAB
BILIRUBIN: NEGATIVE
GLUCOSE (URINE DIPSTICK): NEGATIVE MG/DL
KETONES (URINE DIPSTICK): 15 MG/DL
MULTISTIX LOT#: ABNORMAL NUMERIC
NITRITE, URINE: NEGATIVE
PH, URINE: 5.5 (ref 4.5–8)
PROTEIN (URINE DIPSTICK): 30 MG/DL
SPECIFIC GRAVITY: 1.02 (ref 1–1.03)
URINE-COLOR: YELLOW
UROBILINOGEN,SEMI-QN: 0.2 MG/DL (ref 0–1.9)

## 2022-04-21 PROCEDURE — 3074F SYST BP LT 130 MM HG: CPT | Performed by: PHYSICIAN ASSISTANT

## 2022-04-21 PROCEDURE — 81003 URINALYSIS AUTO W/O SCOPE: CPT | Performed by: PHYSICIAN ASSISTANT

## 2022-04-21 PROCEDURE — 99213 OFFICE O/P EST LOW 20 MIN: CPT | Performed by: PHYSICIAN ASSISTANT

## 2022-04-21 PROCEDURE — 3008F BODY MASS INDEX DOCD: CPT | Performed by: PHYSICIAN ASSISTANT

## 2022-04-21 PROCEDURE — 3079F DIAST BP 80-89 MM HG: CPT | Performed by: PHYSICIAN ASSISTANT

## 2022-04-21 RX ORDER — SULFAMETHOXAZOLE AND TRIMETHOPRIM 800; 160 MG/1; MG/1
1 TABLET ORAL 2 TIMES DAILY
Qty: 14 TABLET | Refills: 0 | Status: SHIPPED | OUTPATIENT
Start: 2022-04-21 | End: 2022-04-28

## 2022-04-21 NOTE — TELEPHONE ENCOUNTER
Pt has not been seen since 12/2019. Pt advised since she has not been seen in over 2 years she would need an appt before an order can be placed. Pt informed [facility] does not have any openings. Pt advised that she can call her pcp to gt an order or go to . PT states the PSR offered her an appt tomorrow. Pt informed [facility] have an opening tonight and a few tomorrow. Pt would like appt at 640pm tonight. Appt booked. Pt also advised if she calls her pcp, they may be able to give an order without an appt if she is up to date with them. Pt states she will call them to see and cancel the appt with us if it is not needed. Annual also booked on 5/6.

## 2022-04-21 NOTE — TELEPHONE ENCOUNTER
Patient is concerned she may have a UTI. Since Monday there has been a burning sensation, frequency and urgency. Please advise if a urinalysis can be ordered.

## 2022-04-26 ENCOUNTER — TELEPHONE (OUTPATIENT)
Dept: FAMILY MEDICINE CLINIC | Facility: CLINIC | Age: 42
End: 2022-04-26

## 2022-04-26 ENCOUNTER — TELEPHONE (OUTPATIENT)
Dept: OBGYN CLINIC | Facility: CLINIC | Age: 42
End: 2022-04-26

## 2022-04-26 NOTE — TELEPHONE ENCOUNTER
Patient saw Familia Cain for this problem on the 21st. This should be addressed by Familia Cain or other staff there. I see the communication has been sent to her.

## 2022-04-26 NOTE — TELEPHONE ENCOUNTER
Sent to MD on Call, KHUSHI. (Pt saw [facility] .)    Pt had a urinalysis, auto w/o scope and an urine culture. Her UA dip appeared abnormal, but urine culture was negative for bacterial growth. Pt was start ed on Bactrim DS. Pt states she took it from 4/21 and tomorrow is the last day 4/27 per pt. Pt continued taking antibiotic. Pt states she still has symptoms of discomfort when she starts to urinate, 2-3/10. Has a little burning sensation with urination and more urgency. Pressure with urination. Denies a fever. Sent to KHUSHI for recs.

## 2022-04-26 NOTE — TELEPHONE ENCOUNTER
Patient was seen by a family medicine provider last week and was diagnosed with a UTI. Tomorrow is the last day for her antibiotic but she is still feeling a mild sense of burning and urgency. She would like some guidance. Please advise.

## 2022-04-26 NOTE — TELEPHONE ENCOUNTER
Pt called and informed of KHUSHI recs, pt states understanding. Notes indicate she has reached out to PCP today for f/u on issue.

## 2022-04-26 NOTE — TELEPHONE ENCOUNTER
Jerad Justice Son:  Please advise further. Tomorrow is the last day on bactrim DS (given 4/21/22 to treat UTI)  Her UA dip appeared abnormal however Urine culture was negative for bacteria growth. Patient continued taking abx. Patient symptoms are not resolving. Has some improvement but continues with burning sensation with urination; has pressure feeling. No fever, no flank pain. Urine color clear. No vaginal symptoms.

## 2022-04-27 ENCOUNTER — OFFICE VISIT (OUTPATIENT)
Dept: FAMILY MEDICINE CLINIC | Facility: CLINIC | Age: 42
End: 2022-04-27
Payer: COMMERCIAL

## 2022-04-27 VITALS
HEART RATE: 64 BPM | HEIGHT: 63 IN | BODY MASS INDEX: 24.1 KG/M2 | SYSTOLIC BLOOD PRESSURE: 124 MMHG | DIASTOLIC BLOOD PRESSURE: 81 MMHG | WEIGHT: 136 LBS

## 2022-04-27 DIAGNOSIS — N89.8 VAGINAL DISCHARGE: ICD-10-CM

## 2022-04-27 DIAGNOSIS — R30.0 DYSURIA: Primary | ICD-10-CM

## 2022-04-27 LAB
APPEARANCE: CLEAR
BILIRUBIN: NEGATIVE
GLUCOSE (URINE DIPSTICK): NEGATIVE MG/DL
KETONES (URINE DIPSTICK): NEGATIVE MG/DL
LEUKOCYTES: NEGATIVE
MULTISTIX LOT#: ABNORMAL NUMERIC
NITRITE, URINE: NEGATIVE
PH, URINE: 5.5 (ref 4.5–8)
PROTEIN (URINE DIPSTICK): NEGATIVE MG/DL
SPECIFIC GRAVITY: 1.01 (ref 1–1.03)
URINE-COLOR: YELLOW
UROBILINOGEN,SEMI-QN: 0.2 MG/DL (ref 0–1.9)

## 2022-04-27 PROCEDURE — 81002 URINALYSIS NONAUTO W/O SCOPE: CPT | Performed by: PHYSICIAN ASSISTANT

## 2022-04-27 PROCEDURE — 3079F DIAST BP 80-89 MM HG: CPT | Performed by: PHYSICIAN ASSISTANT

## 2022-04-27 PROCEDURE — 3008F BODY MASS INDEX DOCD: CPT | Performed by: PHYSICIAN ASSISTANT

## 2022-04-27 PROCEDURE — 99213 OFFICE O/P EST LOW 20 MIN: CPT | Performed by: PHYSICIAN ASSISTANT

## 2022-04-27 PROCEDURE — 3074F SYST BP LT 130 MM HG: CPT | Performed by: PHYSICIAN ASSISTANT

## 2022-04-28 LAB
C TRACH DNA SPEC QL NAA+PROBE: NEGATIVE
N GONORRHOEA DNA SPEC QL NAA+PROBE: NEGATIVE

## 2022-04-29 ENCOUNTER — TELEPHONE (OUTPATIENT)
Dept: FAMILY MEDICINE CLINIC | Facility: CLINIC | Age: 42
End: 2022-04-29

## 2022-04-29 LAB
GENITAL VAGINOSIS SCREEN: NEGATIVE
TRICHOMONAS SCREEN: NEGATIVE

## 2022-05-06 ENCOUNTER — OFFICE VISIT (OUTPATIENT)
Dept: OBGYN CLINIC | Facility: CLINIC | Age: 42
End: 2022-05-06
Payer: COMMERCIAL

## 2022-05-06 VITALS
BODY MASS INDEX: 24 KG/M2 | DIASTOLIC BLOOD PRESSURE: 81 MMHG | WEIGHT: 134.63 LBS | HEART RATE: 55 BPM | SYSTOLIC BLOOD PRESSURE: 123 MMHG

## 2022-05-06 DIAGNOSIS — Z80.3 FAMILY HISTORY OF BREAST CANCER: ICD-10-CM

## 2022-05-06 DIAGNOSIS — Z12.4 SCREENING FOR MALIGNANT NEOPLASM OF CERVIX: ICD-10-CM

## 2022-05-06 DIAGNOSIS — Z97.5 PRESENCE OF IUD: ICD-10-CM

## 2022-05-06 DIAGNOSIS — Z01.419 ENCOUNTER FOR ANNUAL ROUTINE GYNECOLOGICAL EXAMINATION: Primary | ICD-10-CM

## 2022-05-06 PROCEDURE — 99396 PREV VISIT EST AGE 40-64: CPT | Performed by: NURSE PRACTITIONER

## 2022-05-06 PROCEDURE — 3074F SYST BP LT 130 MM HG: CPT | Performed by: NURSE PRACTITIONER

## 2022-05-06 PROCEDURE — 3079F DIAST BP 80-89 MM HG: CPT | Performed by: NURSE PRACTITIONER

## 2022-05-09 LAB — HPV I/H RISK 1 DNA SPEC QL NAA+PROBE: NEGATIVE

## 2022-05-13 ENCOUNTER — OFFICE VISIT (OUTPATIENT)
Dept: FAMILY MEDICINE CLINIC | Facility: CLINIC | Age: 42
End: 2022-05-13
Payer: COMMERCIAL

## 2022-05-13 ENCOUNTER — NURSE TRIAGE (OUTPATIENT)
Dept: FAMILY MEDICINE CLINIC | Facility: CLINIC | Age: 42
End: 2022-05-13

## 2022-05-13 VITALS
DIASTOLIC BLOOD PRESSURE: 76 MMHG | HEIGHT: 63 IN | SYSTOLIC BLOOD PRESSURE: 113 MMHG | WEIGHT: 134 LBS | BODY MASS INDEX: 23.74 KG/M2 | HEART RATE: 57 BPM

## 2022-05-13 DIAGNOSIS — M26.609 TMJ (TEMPOROMANDIBULAR JOINT SYNDROME): Primary | ICD-10-CM

## 2022-05-13 DIAGNOSIS — R09.81 CONGESTION OF NASAL SINUS: ICD-10-CM

## 2022-05-13 LAB
CONTROL LINE PRESENT WITH A CLEAR BACKGROUND (YES/NO): YES YES/NO
KIT EXPIRATION DATE: NORMAL DATE
KIT LOT #: NORMAL NUMERIC

## 2022-05-13 PROCEDURE — 3078F DIAST BP <80 MM HG: CPT | Performed by: FAMILY MEDICINE

## 2022-05-13 PROCEDURE — 3008F BODY MASS INDEX DOCD: CPT | Performed by: FAMILY MEDICINE

## 2022-05-13 PROCEDURE — 87880 STREP A ASSAY W/OPTIC: CPT | Performed by: FAMILY MEDICINE

## 2022-05-13 PROCEDURE — 99213 OFFICE O/P EST LOW 20 MIN: CPT | Performed by: FAMILY MEDICINE

## 2022-05-13 PROCEDURE — 3074F SYST BP LT 130 MM HG: CPT | Performed by: FAMILY MEDICINE

## 2022-05-13 RX ORDER — FLUTICASONE PROPIONATE 50 MCG
2 SPRAY, SUSPENSION (ML) NASAL DAILY
Qty: 1 EACH | Refills: 3 | Status: SHIPPED | OUTPATIENT
Start: 2022-05-13 | End: 2022-09-10

## 2022-05-13 RX ORDER — FLUTICASONE PROPIONATE 50 MCG
SPRAY, SUSPENSION (ML) NASAL
COMMUNITY
Start: 2022-05-07

## 2022-05-13 NOTE — PROGRESS NOTES
Blood pressure 113/76, pulse 57, height 5' 3\" (1.6 m), weight 134 lb (60.8 kg), not currently breastfeeding. Complaining of left ear pain. Minimal nasal congestion. Also with mild dysphagia. No swimming. No fever.     Objective throat erythematous no exudate rapid strep negative    Left ear with TM intact no redness    No speculum exam tenderness and no tragus sign    Neck without adenopathy  Tenderness noted left TMJ  Assessment #1 TMJ #2 eustachian tube dysfunction    Plan #1 written information given soft diet and ibuprofen #2 refilled Flonase    COVID swab sent

## 2022-05-14 LAB — SARS-COV-2 RNA RESP QL NAA+PROBE: NOT DETECTED

## 2022-06-18 ENCOUNTER — OFFICE VISIT (OUTPATIENT)
Dept: DERMATOLOGY CLINIC | Facility: CLINIC | Age: 42
End: 2022-06-18
Payer: COMMERCIAL

## 2022-06-18 DIAGNOSIS — D23.60 BENIGN NEOPLASM OF SKIN OF UPPER LIMB, INCLUDING SHOULDER, UNSPECIFIED LATERALITY: ICD-10-CM

## 2022-06-18 DIAGNOSIS — D23.5 BENIGN NEOPLASM OF SKIN OF TRUNK, EXCEPT SCROTUM: ICD-10-CM

## 2022-06-18 DIAGNOSIS — D22.9 MULTIPLE NEVI: ICD-10-CM

## 2022-06-18 DIAGNOSIS — D23.30 BENIGN NEOPLASM OF SKIN OF FACE: ICD-10-CM

## 2022-06-18 DIAGNOSIS — D48.5 NEOPLASM OF UNCERTAIN BEHAVIOR OF SKIN: Primary | ICD-10-CM

## 2022-06-18 DIAGNOSIS — D23.70 BENIGN NEOPLASM OF SKIN OF LOWER LIMB, INCLUDING HIP, UNSPECIFIED LATERALITY: ICD-10-CM

## 2022-06-18 DIAGNOSIS — L81.4 LENTIGO: ICD-10-CM

## 2022-06-18 DIAGNOSIS — D23.4 BENIGN NEOPLASM OF SCALP AND SKIN OF NECK: ICD-10-CM

## 2022-06-18 PROCEDURE — 88305 TISSUE EXAM BY PATHOLOGIST: CPT | Performed by: DERMATOLOGY

## 2022-06-18 PROCEDURE — 11103 TANGNTL BX SKIN EA SEP/ADDL: CPT | Performed by: DERMATOLOGY

## 2022-06-18 PROCEDURE — 11102 TANGNTL BX SKIN SINGLE LES: CPT | Performed by: DERMATOLOGY

## 2022-06-18 PROCEDURE — 99214 OFFICE O/P EST MOD 30 MIN: CPT | Performed by: DERMATOLOGY

## 2022-06-24 ENCOUNTER — TELEPHONE (OUTPATIENT)
Dept: DERMATOLOGY CLINIC | Facility: CLINIC | Age: 42
End: 2022-06-24

## 2022-06-24 NOTE — TELEPHONE ENCOUNTER
LOV 6/18/22 - Pt called because she is concerned about the biopsy site on her lower leg and wants to make sure it is not infected. Pt states it is a little red around the outside but denies any s/s of infection (denies warmth, denies drainage, no fever or chills). Pt states she will send a photo via [address] Await photo from pt.

## 2022-06-24 NOTE — PROGRESS NOTES
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 Please log in test results. Please call patient and inform of results and recommendations.  (please add to history).   Pt to  rtc 6 mos or prn

## 2022-06-25 NOTE — TELEPHONE ENCOUNTER
Please see [employer] message. Photo reviewed. Monitor, at this time would not recommend antibioticspo , no evidence of any allergy. Continue antibiotic ointment for now.

## 2022-06-27 NOTE — PROGRESS NOTES
Operative Report                     Shave/  Tangential biopsy     Clinical diagnosis:    Size of lesion:    Location:pt with changing nevus  Spec 1 Description >>>>>: left upper back  Spec 1 Comment: r/o atypical nevus irregular tan brown multicolored macule hazy border 1cm  Spec 2 Description >>>>>: left anterior axilla  Spec 2 Comment: changing lesion tan brown papule with darker center 4mm  Spec 3 Description >>>>>: right lateral lower leg  Spec 3 Comment: r/o atypical lesion vs dermatofibroma vs other nodule red brown 1cm      Procedure: With patient in appropriate position the skin of the above was scrubbed with alcohol. Anesthesia was obtained with 1% Xylocaine with epinephrine. The skin surrounding the lesion was placed under tension and the lesion was incised using a #15 scalpel blade. The specimen was sent for histopathologic exam.    Hemostasis was obtained with electrocautery/aluminum chloride. Estimated blood loss less than 2 cc. Biopsy dressed with Polysporin, bandage. Pressure dressing:   No    Complications: None    Written instructions given and reviewed with patient    Await pathology    Contact information reviewed.     Procedural physician:  Jerald Hodges MD

## 2022-06-27 NOTE — PROGRESS NOTES
Pt informed of pathology results and verbalized understanding. Pt scheduled for 6 month full skin exam in December 2022.

## 2022-12-28 ENCOUNTER — OFFICE VISIT (OUTPATIENT)
Dept: DERMATOLOGY CLINIC | Facility: CLINIC | Age: 42
End: 2022-12-28
Payer: COMMERCIAL

## 2022-12-28 DIAGNOSIS — D22.9 MULTIPLE NEVI: Primary | ICD-10-CM

## 2022-12-28 DIAGNOSIS — L81.4 LENTIGO: ICD-10-CM

## 2022-12-28 DIAGNOSIS — D23.5 BENIGN NEOPLASM OF SKIN OF TRUNK: ICD-10-CM

## 2022-12-28 DIAGNOSIS — D23.30 BENIGN NEOPLASM OF SKIN OF FACE: ICD-10-CM

## 2022-12-28 DIAGNOSIS — Z80.8 FAMILY HISTORY OF MELANOMA: ICD-10-CM

## 2022-12-28 DIAGNOSIS — D23.60 BENIGN NEOPLASM OF SKIN OF UPPER LIMB, INCLUDING SHOULDER, UNSPECIFIED LATERALITY: ICD-10-CM

## 2022-12-28 DIAGNOSIS — D23.4 BENIGN NEOPLASM OF SCALP AND SKIN OF NECK: ICD-10-CM

## 2022-12-28 PROCEDURE — 99213 OFFICE O/P EST LOW 20 MIN: CPT | Performed by: DERMATOLOGY

## 2022-12-28 RX ORDER — VALACYCLOVIR HYDROCHLORIDE 1 G/1
2000 TABLET, FILM COATED ORAL EVERY 12 HOURS SCHEDULED
Qty: 4 TABLET | Refills: 12 | Status: SHIPPED | OUTPATIENT
Start: 2022-12-28

## 2023-03-02 ENCOUNTER — OFFICE VISIT (OUTPATIENT)
Dept: FAMILY MEDICINE CLINIC | Facility: CLINIC | Age: 43
End: 2023-03-02

## 2023-03-02 VITALS
SYSTOLIC BLOOD PRESSURE: 125 MMHG | HEART RATE: 66 BPM | BODY MASS INDEX: 23.57 KG/M2 | HEIGHT: 63 IN | WEIGHT: 133 LBS | DIASTOLIC BLOOD PRESSURE: 77 MMHG

## 2023-03-02 DIAGNOSIS — R09.81 NASAL CONGESTION: ICD-10-CM

## 2023-03-02 DIAGNOSIS — J02.9 SORE THROAT: Primary | ICD-10-CM

## 2023-03-02 PROCEDURE — 3008F BODY MASS INDEX DOCD: CPT | Performed by: FAMILY MEDICINE

## 2023-03-02 PROCEDURE — 99213 OFFICE O/P EST LOW 20 MIN: CPT | Performed by: FAMILY MEDICINE

## 2023-03-02 PROCEDURE — 87880 STREP A ASSAY W/OPTIC: CPT | Performed by: FAMILY MEDICINE

## 2023-03-02 PROCEDURE — 3074F SYST BP LT 130 MM HG: CPT | Performed by: FAMILY MEDICINE

## 2023-03-02 PROCEDURE — 3078F DIAST BP <80 MM HG: CPT | Performed by: FAMILY MEDICINE

## 2023-03-02 RX ORDER — PREDNISONE 20 MG/1
40 TABLET ORAL DAILY
Qty: 10 TABLET | Refills: 0 | Status: SHIPPED | OUTPATIENT
Start: 2023-03-02 | End: 2023-03-07

## 2023-03-02 RX ORDER — AMOXICILLIN 875 MG/1
875 TABLET, COATED ORAL 2 TIMES DAILY
Qty: 14 TABLET | Refills: 0 | Status: SHIPPED | OUTPATIENT
Start: 2023-03-02 | End: 2023-03-09

## 2023-04-12 ENCOUNTER — HOSPITAL ENCOUNTER (OUTPATIENT)
Age: 43
Discharge: ANOTHER HOSPITAL | End: 2023-04-12
Payer: COMMERCIAL

## 2023-04-12 ENCOUNTER — NURSE TRIAGE (OUTPATIENT)
Dept: FAMILY MEDICINE CLINIC | Facility: CLINIC | Age: 43
End: 2023-04-12

## 2023-04-12 VITALS
DIASTOLIC BLOOD PRESSURE: 90 MMHG | BODY MASS INDEX: 22.2 KG/M2 | HEIGHT: 64 IN | SYSTOLIC BLOOD PRESSURE: 138 MMHG | TEMPERATURE: 97 F | OXYGEN SATURATION: 100 % | HEART RATE: 62 BPM | RESPIRATION RATE: 18 BRPM | WEIGHT: 130 LBS

## 2023-04-12 DIAGNOSIS — R10.9 ABDOMINAL PAIN, ACUTE: Primary | ICD-10-CM

## 2023-04-12 LAB
B-HCG UR QL: NEGATIVE
BILIRUB UR QL STRIP: NEGATIVE
CLARITY UR: CLEAR
COLOR UR: YELLOW
GLUCOSE UR STRIP-MCNC: NEGATIVE MG/DL
HGB UR QL STRIP: NEGATIVE
KETONES UR STRIP-MCNC: NEGATIVE MG/DL
LEUKOCYTE ESTERASE UR QL STRIP: NEGATIVE
NITRITE UR QL STRIP: NEGATIVE
PH UR STRIP: 5.5 [PH]
PROT UR STRIP-MCNC: NEGATIVE MG/DL
SP GR UR STRIP: >=1.03
UROBILINOGEN UR STRIP-ACNC: <2 MG/DL

## 2023-04-12 PROCEDURE — 81025 URINE PREGNANCY TEST: CPT | Performed by: NURSE PRACTITIONER

## 2023-04-12 PROCEDURE — 99204 OFFICE O/P NEW MOD 45 MIN: CPT | Performed by: NURSE PRACTITIONER

## 2023-04-12 PROCEDURE — 81002 URINALYSIS NONAUTO W/O SCOPE: CPT | Performed by: NURSE PRACTITIONER

## 2023-04-12 NOTE — ED INITIAL ASSESSMENT (HPI)
Pt c/o lower abdominal pain, which started around noon today. Rates pain 7/10. Denies urinary symptoms. Denies vomiting.

## 2023-04-13 ENCOUNTER — OFFICE VISIT (OUTPATIENT)
Dept: FAMILY MEDICINE CLINIC | Facility: CLINIC | Age: 43
End: 2023-04-13

## 2023-04-13 ENCOUNTER — HOSPITAL ENCOUNTER (OUTPATIENT)
Dept: CT IMAGING | Facility: HOSPITAL | Age: 43
Discharge: HOME | End: 2023-04-13
Attending: FAMILY MEDICINE
Payer: COMMERCIAL

## 2023-04-13 VITALS
SYSTOLIC BLOOD PRESSURE: 105 MMHG | DIASTOLIC BLOOD PRESSURE: 68 MMHG | HEART RATE: 71 BPM | HEIGHT: 63 IN | WEIGHT: 129 LBS | BODY MASS INDEX: 22.86 KG/M2

## 2023-04-13 DIAGNOSIS — Z12.31 SCREENING MAMMOGRAM, ENCOUNTER FOR: Primary | ICD-10-CM

## 2023-04-13 DIAGNOSIS — R10.31 RLQ ABDOMINAL PAIN: ICD-10-CM

## 2023-04-13 DIAGNOSIS — R10.32 LLQ ABDOMINAL PAIN: ICD-10-CM

## 2023-04-13 LAB
CONTROL LINE PRESENT WITH A CLEAR BACKGROUND (YES/NO): YES YES/NO
CREAT BLD-MCNC: 0.8 MG/DL
GFR SERPLBLD BASED ON 1.73 SQ M-ARVRAT: 94 ML/MIN/1.73M2 (ref 60–?)
KIT LOT #: 7307 NUMERIC

## 2023-04-13 PROCEDURE — 3078F DIAST BP <80 MM HG: CPT | Performed by: FAMILY MEDICINE

## 2023-04-13 PROCEDURE — 3008F BODY MASS INDEX DOCD: CPT | Performed by: FAMILY MEDICINE

## 2023-04-13 PROCEDURE — 82565 ASSAY OF CREATININE: CPT

## 2023-04-13 PROCEDURE — 74177 CT ABD & PELVIS W/CONTRAST: CPT | Performed by: FAMILY MEDICINE

## 2023-04-13 PROCEDURE — 3074F SYST BP LT 130 MM HG: CPT | Performed by: FAMILY MEDICINE

## 2023-04-13 PROCEDURE — 99213 OFFICE O/P EST LOW 20 MIN: CPT | Performed by: FAMILY MEDICINE

## 2023-04-13 PROCEDURE — 81025 URINE PREGNANCY TEST: CPT | Performed by: FAMILY MEDICINE

## 2023-04-13 NOTE — PROGRESS NOTES
Blood pressure 105/68, pulse 71, height 5' 3\" (1.6 m), weight 129 lb (58.5 kg), not currently breastfeeding. Episode of severe abdominal pain began yesterday. Went to immediate care felt slightly better afterward. She has eaten today and had a normal bowel movement. Denies vomiting. No fever. Is having some discomfort in my office. No melena no hematochezia no diarrhea. No recent NSAID use.     Objective abdomen with decreased bowel sounds tenderness noted at McBurney's point also in the left lower quadrant    Assessment abdominal pain left and right lower quadrant    Plan stat CT of the abdomen ordered    We will follow with results

## 2023-04-14 ENCOUNTER — TELEPHONE (OUTPATIENT)
Dept: FAMILY MEDICINE CLINIC | Facility: CLINIC | Age: 43
End: 2023-04-14

## 2023-04-14 NOTE — TELEPHONE ENCOUNTER
Verified name and . Patient states that she received a voicemail from Dr. Suraj Short yesterday in regards to CT abdomen and pelvis- no result notes seen at this time. Patient is requesting a phone call directly from Dr. Suraj Short for specific questions she has in regards to test results.     Best call back number: [phone]

## 2023-05-01 ENCOUNTER — OFFICE VISIT (OUTPATIENT)
Dept: DERMATOLOGY CLINIC | Facility: CLINIC | Age: 43
End: 2023-05-01

## 2023-05-01 DIAGNOSIS — D23.30 BENIGN NEOPLASM OF SKIN OF FACE: ICD-10-CM

## 2023-05-01 DIAGNOSIS — Z80.8 FAMILY HISTORY OF MELANOMA: ICD-10-CM

## 2023-05-01 DIAGNOSIS — D22.9 MULTIPLE NEVI: ICD-10-CM

## 2023-05-01 DIAGNOSIS — D23.5 BENIGN NEOPLASM OF SKIN OF TRUNK: ICD-10-CM

## 2023-05-01 DIAGNOSIS — L81.4 LENTIGO: ICD-10-CM

## 2023-05-01 DIAGNOSIS — D23.4 BENIGN NEOPLASM OF SCALP AND SKIN OF NECK: ICD-10-CM

## 2023-05-01 DIAGNOSIS — D23.70 BENIGN NEOPLASM OF SKIN OF LOWER LIMB, INCLUDING HIP, UNSPECIFIED LATERALITY: ICD-10-CM

## 2023-05-01 DIAGNOSIS — D23.60 BENIGN NEOPLASM OF SKIN OF UPPER LIMB, INCLUDING SHOULDER, UNSPECIFIED LATERALITY: Primary | ICD-10-CM

## 2023-05-01 PROCEDURE — 99213 OFFICE O/P EST LOW 20 MIN: CPT | Performed by: DERMATOLOGY

## 2023-05-08 ENCOUNTER — OFFICE VISIT (OUTPATIENT)
Dept: OBGYN CLINIC | Facility: CLINIC | Age: 43
End: 2023-05-08

## 2023-05-08 VITALS
DIASTOLIC BLOOD PRESSURE: 84 MMHG | SYSTOLIC BLOOD PRESSURE: 131 MMHG | WEIGHT: 133.63 LBS | BODY MASS INDEX: 24 KG/M2 | HEART RATE: 66 BPM

## 2023-05-08 DIAGNOSIS — Z01.419 ENCOUNTER FOR GYNECOLOGICAL EXAMINATION: Primary | ICD-10-CM

## 2023-05-11 ENCOUNTER — HOSPITAL ENCOUNTER (OUTPATIENT)
Dept: MAMMOGRAPHY | Age: 43
Discharge: HOME | End: 2023-05-11
Attending: FAMILY MEDICINE
Payer: COMMERCIAL

## 2023-05-11 DIAGNOSIS — Z12.31 SCREENING MAMMOGRAM, ENCOUNTER FOR: ICD-10-CM

## 2023-05-11 PROCEDURE — 77067 SCR MAMMO BI INCL CAD: CPT | Performed by: FAMILY MEDICINE

## 2023-05-11 PROCEDURE — 77063 BREAST TOMOSYNTHESIS BI: CPT | Performed by: FAMILY MEDICINE

## 2023-09-23 ENCOUNTER — HOSPITAL ENCOUNTER (OUTPATIENT)
Age: 43
Discharge: HOME | End: 2023-09-23
Payer: COMMERCIAL

## 2023-09-23 VITALS
RESPIRATION RATE: 18 BRPM | HEART RATE: 66 BPM | TEMPERATURE: 98 F | DIASTOLIC BLOOD PRESSURE: 80 MMHG | OXYGEN SATURATION: 100 % | SYSTOLIC BLOOD PRESSURE: 136 MMHG

## 2023-09-23 DIAGNOSIS — S61.402A AVULSION OF SKIN OF LEFT HAND, INITIAL ENCOUNTER: Primary | ICD-10-CM

## 2023-09-23 DIAGNOSIS — T07.XXXA MULTIPLE WOUNDS: ICD-10-CM

## 2023-09-23 RX ORDER — IBUPROFEN 600 MG/1
600 TABLET ORAL EVERY 6 HOURS PRN
Qty: 30 TABLET | Refills: 0 | Status: SHIPPED | OUTPATIENT
Start: 2023-09-23

## 2023-09-23 RX ORDER — HYDROCODONE BITARTRATE AND ACETAMINOPHEN 5; 325 MG/1; MG/1
1 TABLET ORAL ONCE
Status: COMPLETED | OUTPATIENT
Start: 2023-09-23 | End: 2023-09-23

## 2023-09-23 RX ORDER — IBUPROFEN 600 MG/1
600 TABLET ORAL ONCE
Status: COMPLETED | OUTPATIENT
Start: 2023-09-23 | End: 2023-09-23

## 2023-09-23 RX ORDER — CEPHALEXIN 500 MG/1
500 CAPSULE ORAL 4 TIMES DAILY
Qty: 28 CAPSULE | Refills: 0 | Status: SHIPPED | OUTPATIENT
Start: 2023-09-23 | End: 2023-09-30

## 2023-09-23 RX ORDER — HYDROCODONE BITARTRATE AND ACETAMINOPHEN 5; 325 MG/1; MG/1
1 TABLET ORAL 2 TIMES DAILY PRN
Qty: 8 TABLET | Refills: 0 | Status: SHIPPED | OUTPATIENT
Start: 2023-09-23

## 2023-09-23 NOTE — DISCHARGE INSTRUCTIONS
Take ibuprofen 600 mg every 6 hours for pain and swelling  Alternate with Tylenol 1000 mg every 6 hours for pain  If needing something more, you can take Norco at night before bedtime, or two times a day as long as not driving, working or drinking alcohol. Ice 15 minutes on 2 hours off to area of pain    Good wound care, in 24 hours remove dressing, shower as usual pat area dry place new dressing, finger splint and Ace wrap back around hand. No heavy lifting, strenuous activities or weightlifting with affected hand until wounds are healed. See your doctor OR hand specialist given as needed if pain continues despite healing wounds, fevers develop, redness and swelling occur.

## 2023-11-06 ENCOUNTER — OFFICE VISIT (OUTPATIENT)
Dept: DERMATOLOGY CLINIC | Facility: CLINIC | Age: 43
End: 2023-11-06
Payer: COMMERCIAL

## 2023-11-06 DIAGNOSIS — D23.70 BENIGN NEOPLASM OF SKIN OF LOWER LIMB, INCLUDING HIP, UNSPECIFIED LATERALITY: ICD-10-CM

## 2023-11-06 DIAGNOSIS — D48.5 NEOPLASM OF UNCERTAIN BEHAVIOR OF SKIN: Primary | ICD-10-CM

## 2023-11-06 DIAGNOSIS — D22.9 MULTIPLE NEVI: ICD-10-CM

## 2023-11-06 DIAGNOSIS — D23.30 BENIGN NEOPLASM OF SKIN OF FACE: ICD-10-CM

## 2023-11-06 DIAGNOSIS — L81.4 LENTIGO: ICD-10-CM

## 2023-11-06 DIAGNOSIS — D23.4 BENIGN NEOPLASM OF SCALP AND SKIN OF NECK: ICD-10-CM

## 2023-11-06 DIAGNOSIS — Z80.8 FAMILY HISTORY OF MELANOMA: ICD-10-CM

## 2023-11-06 DIAGNOSIS — D23.5 BENIGN NEOPLASM OF SKIN OF TRUNK: ICD-10-CM

## 2023-11-06 DIAGNOSIS — D23.60 BENIGN NEOPLASM OF SKIN OF UPPER LIMB, INCLUDING SHOULDER, UNSPECIFIED LATERALITY: ICD-10-CM

## 2023-11-06 PROCEDURE — 99214 OFFICE O/P EST MOD 30 MIN: CPT | Performed by: DERMATOLOGY

## 2023-11-06 PROCEDURE — 88305 TISSUE EXAM BY PATHOLOGIST: CPT | Performed by: DERMATOLOGY

## 2023-11-06 PROCEDURE — 11102 TANGNTL BX SKIN SINGLE LES: CPT | Performed by: DERMATOLOGY

## 2023-11-09 NOTE — PROGRESS NOTES
Logged in path book and pmh. Pt informed of pathology results. Pt scheduled for 6 month skin exam in May 2024.

## 2023-11-09 NOTE — PROGRESS NOTES
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 -The atypical nevus appears narrowly excised in the examined planes of section  No further surgery, f/u in 6 mos  . Please log in test results. Please call patient and inform of results and recommendations.  (please add to history). Pt to  rtc   6 mos for FBEx or prn.

## 2023-11-19 NOTE — PROGRESS NOTES
Operative Report                     Shave/  Tangential biopsy     Clinical diagnosis:    Size of lesion:   Pt with changing lesion left lower abdomen dark brwon and tan patch with red brown center and more scalloped borders 1.4cm r/o atypical nevus   Location:    Procedure: With patient in appropriate position the skin of the above was scrubbed with alcohol. Anesthesia was obtained with 1% Xylocaine with epinephrine. The skin surrounding the lesion was placed under tension and the lesion was incised using a #15 scalpel blade. The specimen was sent for histopathologic exam.    Hemostasis was obtained with electrocautery/aluminum chloride. Estimated blood loss less than 2 cc. Biopsy dressed with Polysporin, bandage. Pressure dressing:   No    Complications: None    Written instructions given and reviewed with patient    Await pathology    Contact information reviewed.     Procedural physician:  Susie Bhatti MD

## 2023-11-19 NOTE — PROGRESS NOTES
Wilfrid Ramirez is a 39year old female. HPI:     CC:    Chief Complaint   Patient presents with    Full Skin Exam     LOV 23. Patient present for Full Body Skin exam, Denies any concerns at this time. Patient denies personal Hx of skin cancer. Has family Hx of melanoma(Mother). Allergies:  Patient has no known allergies. HISTORY:    Past Medical History:   Diagnosis Date    Compound nevus     back and axilla    Dermatofibroma     right leg    Dysplastic nevus 2023    Left lower abdomen    Recurrent UTI (urinary tract infection)       Past Surgical History:   Procedure Laterality Date      2002    13 hr labor  36 week 5 lb(s) 3 oz Male \"Merrick\"  JLK  PIH, A1GDM      2010    CAP ([facility])      2012      APGAR:9 (1 min) 9 (5 min)      Family History   Problem Relation Age of Onset    Breast Cancer Paternal Aunt 54         from complications of breast cancer    Breast Cancer Paternal Aunt 54         from complications of breast cancer    Hypertension Mother     Breast Cancer Mother 71    Melanoma Mother             Cancer Mother     Diabetes Maternal Grandmother     Arthritis Maternal Grandmother     Other (Other) Father 55        mva death. Breast Cancer Sister 52      Social History     Socioeconomic History    Marital status:    Tobacco Use    Smoking status: Former     Packs/day: 0.25     Years: 8.00     Additional pack years: 0.00     Total pack years: 2.00     Types: Cigarettes     Quit date: 6/15/2006     Years since quittin.4     Passive exposure: Never    Smokeless tobacco: Never    Tobacco comments:     quit 2006   Vaping Use    Vaping Use: Never used   Substance and Sexual Activity    Alcohol use: Yes     Alcohol/week: 0.0 standard drinks of alcohol     Comment: OCC    Drug use: No    Sexual activity: Yes     Partners: Male     Birth control/protection: Mirena, I.U.D.    Other Topics Concern    Caffeine Concern Yes     Comment: coffee 1 cup daily    History of tanning Yes    Outdoor occupation No    Breast feeding No    Reaction to local anesthetic No    Pt has a pacemaker No    Pt has a defibrillator No        Current Outpatient Medications   Medication Sig Dispense Refill    fluticasone propionate 50 MCG/ACT Nasal Suspension       Cetirizine HCl 5 MG Oral Tab Take 1 tablet (5 mg total) by mouth daily. ibuprofen 600 MG Oral Tab Take 1 tablet (600 mg total) by mouth every 6 (six) hours as needed for Pain or Fever. 30 tablet 0    HYDROcodone-acetaminophen 5-325 MG Oral Tab Take 1 tablet by mouth 2 (two) times daily as needed for Pain. 8 tablet 0     Allergies:   No Known Allergies    Past Medical History:   Diagnosis Date    Compound nevus     back and axilla    Dermatofibroma     right leg    Dysplastic nevus 2023    Left lower abdomen    Recurrent UTI (urinary tract infection)      Past Surgical History:   Procedure Laterality Date      2002    13 hr labor  36 week 5 lb(s) 3 oz Male \"Merrick\"  JLK  PIH, A1GDM      2010    CAP ([facility])      2012      APGAR:9 (1 min) 9 (5 min)     Social History     Socioeconomic History    Marital status:      Spouse name: Not on file    Number of children: Not on file    Years of education: Not on file    Highest education level: Not on file   Occupational History    Not on file   Tobacco Use    Smoking status: Former     Packs/day: 0.25     Years: 8.00     Additional pack years: 0.00     Total pack years: 2.00     Types: Cigarettes     Quit date: 6/15/2006     Years since quittin.4     Passive exposure: Never    Smokeless tobacco: Never    Tobacco comments:     quit 2006   Vaping Use    Vaping Use: Never used   Substance and Sexual Activity    Alcohol use: Yes     Alcohol/week: 0.0 standard drinks of alcohol     Comment: OCC    Drug use: No    Sexual activity: Yes     Partners: Male     Birth control/protection: Mirena, I.U.D.    Other Topics Concern  Service Not Asked    Blood Transfusions Not Asked    Caffeine Concern Yes     Comment: coffee 1 cup daily    Occupational Exposure Not Asked    Hobby Hazards Not Asked    Sleep Concern Not Asked    Stress Concern Not Asked    Weight Concern Not Asked    Special Diet Not Asked    Back Care Not Asked    Exercise Not Asked    Bike Helmet Not Asked    Seat Belt Not Asked    Self-Exams Not Asked    Grew up on a farm Not Asked    History of tanning Yes    Outdoor occupation No    Breast feeding No    Reaction to local anesthetic No    Pt has a pacemaker No    Pt has a defibrillator No   Social History Narrative    Not on file     Social Determinants of Health     Financial Resource Strain: Not on file   Food Insecurity: Not on file   Transportation Needs: Not on file   Physical Activity: Not on file   Stress: Not on file   Social Connections: Not on file   Housing Stability: Not on file     Family History   Problem Relation Age of Onset    Breast Cancer Paternal Aunt 54         from complications of breast cancer    Breast Cancer Paternal Aunt 54         from complications of breast cancer    Hypertension Mother     Breast Cancer Mother 71    Melanoma Mother             Cancer Mother     Diabetes Maternal Grandmother     Arthritis Maternal Grandmother     Other (Other) Father 55        mva death. Breast Cancer Sister 52       There were no vitals filed for this visit. HPI:    Chief Complaint   Patient presents with    Full Skin Exam     LOV 23. Patient present for Full Body Skin exam, Denies any concerns at this time. Patient denies personal Hx of skin cancer. Has family Hx of melanoma(Mother). Follow-up skin exam patient concerned with tags at left shoulder. Irritated. No changing pigmented lesions noted by patient    Patient's mother with history of melanoma.     Patient with history of excessive sun exposure, tanning bed use in the past.  Now uses sunscreen consistently    Patient presents with concerns above. Patient has been in their usual state of health. History, medications, allergies reviewed as noted. ROS:  Denies any other systemic complaints. No new or changeing lesions other than noted above. No fevers, chills, night sweats, unusual sun sensitivity. No other skin complaints. History, medications, allergies reviewed as noted. Physical Examination:     Well-developed well-nourished patient alert oriented in no acute distress. Exam total-body performed, including scalp, head, neck, face,nails, hair, external eyes, including conjunctival mucosa, eyelids, lips external ears, back, chest,/ breasts, axillae,  abdomen, arms, legs, palms. Multiple light to medium brown, well marginated, uniformly pigmented, macules and papules 6 mm and less scattered on exam. pigmented lesions examined with dermoscopy benign-appearing patterns. Waxy tannish keratotic papules scattered, cherry-red vascular papules scattered. See map today's date for lesions noted . Otherwise remarkable for lesions as noted on map. See details of examination  See Assessment /Plan for additional history and physical exam also:    Assessment / plan:    Orders Placed This Encounter   Procedures    Specimen to Pathology, Tissue [IHP Pt to [address] [place] [address] for this Visit:  Requested Prescriptions      No prescriptions requested or ordered in this encounter         Encounter Diagnoses   Name Primary? Neoplasm of uncertain behavior of skin Yes    Multiple nevi     Lentigo     Benign neoplasm of scalp and skin of neck     Benign neoplasm of skin of upper limb, including shoulder, unspecified laterality     Benign neoplasm of skin of face     Benign neoplasm of skin of trunk     Family history of melanoma     Benign neoplasm of skin of lower limb, including hip, unspecified laterality        See details on map.       Remarkable for:     Pt with changing lesion left lower abdomen dark brown and tan patch with red brown center and more scalloped borders 1.4cm r/o atypical nevus   Shave/ tangential biopsy performed, operative note and consent in chart further plans pending pathology    More frequent follow-up given history of dysplastic nevi every 4 months continue  SKs skin tags along bra line, shoulders reassurance    Right tan-brown 3 mm papule slightly darker periphery at right forearm recommend observation. Extensive junctional nevi benign-appearing nevi benign patterns on dermoscopy    HSV as needed flare Valtrex 2 tabs, 12 hours later 2 tabs. Flare let us know if worsening symptoms consider longer course    Biopsy left upper back atypical lentiginous nevus with mild dysplasia-excised 6/22  No recurrence. Overall doing well   left anterior axilla,compound nevus right lateral lower leg Dermatofibroma 6/22      History of lentigines, melasma stable. Continue sun protection over-the-counter retinol  Follow-up more frequently if lesions are atypical.  Ideally q. 4 to 6 months  Extensive nevi, many junctional nevi over the chest back arms. In particular multiple lesions over the lumbar back CMAP observe. Dermal papular nodule consistent with dermatofibroma at right lateral leg. Multiple nevi over the toes feet. Recommend observation. Lesion at left mid back 4 x 6 mm with slightly retiform border. Observe. Darker focus at inferior and central border of lesion. Benign pattern on dermoscopy careful monitoring of this lesion in particular lesion appears unchanged    Extensive sun damage encourage sunscreen sun protection regular follow-up ideally recheck lesion in 4 months  Please refer to map for specific lesions. See additional diagnoses. Pros cons of various therapies, risks benefits discussed. Pathophysiology discussed with patient. Therapeutic options reviewed. See  Medications in grid. Instructions reviewed at length.     Benign nevi, seborrheic  keratoses, cherry angiomas:  Reassurance regarding other benign skin lesions. Signs and symptoms of skin cancer, ABCDE's of melanoma discussed with patient. Sunscreen use, sun protection, self exams reviewed. Followup as noted RTC routine checkup4- 6 mosor p.r.n. Encounter Times new patient  Including precharting, reviewing chart, prior notes obtaining history: 10 minutes, medical exam :10 minutes, notes on body map, plan, counseling 10minutes My total time spent caring for the patient on the day of the encounter: 30 minutes     The patient indicates understanding of these issues and agrees to the plan. The patient is asked to return as noted in follow-up/ above. This note was generated using Dragon voice recognition software. Please contact me regarding any confusion resulting from errors in recognition.

## 2024-04-10 ENCOUNTER — ORDER TRANSCRIPTION (OUTPATIENT)
Dept: ADMINISTRATIVE | Facility: HOSPITAL | Age: 44
End: 2024-04-10

## 2024-04-10 DIAGNOSIS — Z12.31 ENCOUNTER FOR SCREENING MAMMOGRAM FOR MALIGNANT NEOPLASM OF BREAST: Primary | ICD-10-CM

## 2024-05-06 ENCOUNTER — OFFICE VISIT (OUTPATIENT)
Dept: DERMATOLOGY CLINIC | Facility: CLINIC | Age: 44
End: 2024-05-06

## 2024-05-06 DIAGNOSIS — L81.4 LENTIGO: ICD-10-CM

## 2024-05-06 DIAGNOSIS — D23.4 BENIGN NEOPLASM OF SCALP AND SKIN OF NECK: ICD-10-CM

## 2024-05-06 DIAGNOSIS — Z80.8 FAMILY HISTORY OF MELANOMA: ICD-10-CM

## 2024-05-06 DIAGNOSIS — D23.30 BENIGN NEOPLASM OF SKIN OF FACE: ICD-10-CM

## 2024-05-06 DIAGNOSIS — D22.9 MULTIPLE NEVI: ICD-10-CM

## 2024-05-06 DIAGNOSIS — D23.5 BENIGN NEOPLASM OF SKIN OF TRUNK: ICD-10-CM

## 2024-05-06 DIAGNOSIS — Z13.89 ENCOUNTER FOR SURVEILLANCE OF ABNORMAL NEVI: Primary | ICD-10-CM

## 2024-05-06 DIAGNOSIS — D23.60 BENIGN NEOPLASM OF SKIN OF UPPER LIMB, INCLUDING SHOULDER, UNSPECIFIED LATERALITY: ICD-10-CM

## 2024-05-06 DIAGNOSIS — Z87.898 HISTORY OF ATYPICAL NEVUS: ICD-10-CM

## 2024-05-06 DIAGNOSIS — D23.70 BENIGN NEOPLASM OF SKIN OF LOWER LIMB, INCLUDING HIP, UNSPECIFIED LATERALITY: ICD-10-CM

## 2024-05-18 NOTE — PROGRESS NOTES
Mary Bowden is a 43 year old female.  HPI:     CC:    Chief Complaint   Patient presents with    Full Skin Exam     LOV 23. Patient present for FBSE. Denies any concerns at this time. Denies personal Hx of skin cancer. Has family Hx of melanoma(Mother).         Allergies:  Patient has no known allergies.    HISTORY:    Past Medical History:    Compound nevus    back and axilla    Dermatofibroma    right leg    Dysplastic nevus    Left lower abdomen    Recurrent UTI (urinary tract infection)      Past Surgical History:   Procedure Laterality Date          13 hr labor  36 week 5 lb(s) 3 oz Male \"Merrick\"  JLK  PIH, A1GDM      2010    CAP ([facility])      2012      APGAR:9 (1 min) 9 (5 min)      Family History   Problem Relation Age of Onset    Breast Cancer Paternal Aunt 55         from complications of breast cancer    Breast Cancer Paternal Aunt 55         from complications of breast cancer    Hypertension Mother     Breast Cancer Mother 69    Melanoma Mother             Cancer Mother     Diabetes Maternal Grandmother     Arthritis Maternal Grandmother     Other (Other) Father 46        mva death.    Breast Cancer Sister 47      Social History     Socioeconomic History    Marital status:    Tobacco Use    Smoking status: Former     Current packs/day: 0.00     Average packs/day: 0.3 packs/day for 8.0 years (2.0 ttl pk-yrs)     Types: Cigarettes     Start date: 6/15/1998     Quit date: 6/15/2006     Years since quittin.9     Passive exposure: Never    Smokeless tobacco: Never    Tobacco comments:     quit 2006   Vaping Use    Vaping status: Never Used   Substance and Sexual Activity    Alcohol use: Yes     Alcohol/week: 0.0 standard drinks of alcohol     Comment: OCC    Drug use: No    Sexual activity: Yes     Partners: Male     Birth control/protection: Mirena, I.U.D.   Other Topics Concern    Caffeine Concern Yes     Comment: coffee 1 cup daily    History of  tanning Yes    Outdoor occupation No    Breast feeding No    Reaction to local anesthetic No    Pt has a pacemaker No    Pt has a defibrillator No        Current Outpatient Medications   Medication Sig Dispense Refill    fluticasone propionate 50 MCG/ACT Nasal Suspension       Cetirizine HCl 5 MG Oral Tab Take 1 tablet (5 mg total) by mouth daily.      ibuprofen 600 MG Oral Tab Take 1 tablet (600 mg total) by mouth every 6 (six) hours as needed for Pain or Fever. 30 tablet 0    HYDROcodone-acetaminophen 5-325 MG Oral Tab Take 1 tablet by mouth 2 (two) times daily as needed for Pain. 8 tablet 0     Allergies:   No Known Allergies    Past Medical History:    Compound nevus    back and axilla    Dermatofibroma    right leg    Dysplastic nevus    Left lower abdomen    Recurrent UTI (urinary tract infection)     Past Surgical History:   Procedure Laterality Date          13 hr labor  36 week 5 lb(s) 3 oz Male \"Merrick\"  JLK  PIH, A1GDM      2010    CAP ([facility])      2012      APGAR:9 (1 min) 9 (5 min)     Social History     Socioeconomic History    Marital status:      Spouse name: Not on file    Number of children: Not on file    Years of education: Not on file    Highest education level: Not on file   Occupational History    Not on file   Tobacco Use    Smoking status: Former     Current packs/day: 0.00     Average packs/day: 0.3 packs/day for 8.0 years (2.0 ttl pk-yrs)     Types: Cigarettes     Start date: 6/15/1998     Quit date: 6/15/2006     Years since quittin.9     Passive exposure: Never    Smokeless tobacco: Never    Tobacco comments:     quit 2006   Vaping Use    Vaping status: Never Used   Substance and Sexual Activity    Alcohol use: Yes     Alcohol/week: 0.0 standard drinks of alcohol     Comment: OCC    Drug use: No    Sexual activity: Yes     Partners: Male     Birth control/protection: Mirena, I.U.D.   Other Topics Concern     Service Not Asked    Blood  Transfusions Not Asked    Caffeine Concern Yes     Comment: coffee 1 cup daily    Occupational Exposure Not Asked    Hobby Hazards Not Asked    Sleep Concern Not Asked    Stress Concern Not Asked    Weight Concern Not Asked    Special Diet Not Asked    Back Care Not Asked    Exercise Not Asked    Bike Helmet Not Asked    Seat Belt Not Asked    Self-Exams Not Asked    Grew up on a farm Not Asked    History of tanning Yes    Outdoor occupation No    Breast feeding No    Reaction to local anesthetic No    Pt has a pacemaker No    Pt has a defibrillator No   Social History Narrative    Not on file     Social Determinants of Health     Financial Resource Strain: Not on file   Food Insecurity: Not on file   Transportation Needs: Not on file   Physical Activity: Not on file   Stress: Not on file   Social Connections: Not on file   Housing Stability: Not on file     Family History   Problem Relation Age of Onset    Breast Cancer Paternal Aunt 55         from complications of breast cancer    Breast Cancer Paternal Aunt 55         from complications of breast cancer    Hypertension Mother     Breast Cancer Mother 69    Melanoma Mother             Cancer Mother     Diabetes Maternal Grandmother     Arthritis Maternal Grandmother     Other (Other) Father 46        mva death.    Breast Cancer Sister 47       There were no vitals filed for this visit.    HPI:    Chief Complaint   Patient presents with    Full Skin Exam     LOV 23. Patient present for FBSE. Denies any concerns at this time. Denies personal Hx of skin cancer. Has family Hx of melanoma(Mother).     Follow-up skin exam patient with history of atypical nevi.  No particular lesions of concern at this time.  No changing pigmented lesions noted by patient    Patient's mother with history of melanoma.    Patient with history of excessive sun exposure, tanning bed remotely.  Now uses sunscreen consistently    Patient presents with concerns  above.    Patient has been in their usual state of health.  History, medications, allergies reviewed as noted.      ROS:  Denies any other systemic complaints.  No new or changeing lesions other than noted above. No fevers, chills, night sweats, unusual sun sensitivity.  No other skin complaints.        History, medications, allergies reviewed as noted.       Physical Examination:     Well-developed well-nourished patient alert oriented in no acute distress.  Exam total-body performed, including scalp, head, neck, face,nails, hair, external eyes, including conjunctival mucosa, eyelids, lips external ears, back, chest,/ breasts, axillae,  abdomen, arms, legs, palms.     Multiple light to medium brown, well marginated, uniformly pigmented, macules and papules 6 mm and less scattered on exam. pigmented lesions examined with dermoscopy benign-appearing patterns.     Waxy tannish keratotic papules scattered, cherry-red vascular papules scattered.    See map today's date for lesions noted .      Otherwise remarkable for lesions as noted on map.  See details of examination  See Assessment /Plan for additional history and physical exam also:    Assessment / plan:    No orders of the defined types were placed in this encounter.      Meds & Refills for this Visit:  Requested Prescriptions      No prescriptions requested or ordered in this encounter         Encounter Diagnoses   Name Primary?    Multiple nevi     Lentigo     Family history of melanoma     Benign neoplasm of scalp and skin of neck     Benign neoplasm of skin of upper limb, including shoulder, unspecified laterality     Benign neoplasm of skin of face     Benign neoplasm of skin of trunk     Benign neoplasm of skin of lower limb, including hip, unspecified laterality     History of atypical nevus     Encounter for surveillance of abnormal nevi Yes       See details on map.      Remarkable for:    Follow-up atypical nevus no recurrence  left lower  abdomen,11/23  -Atypical compound lentiginous nevus with mild melanocytic dysplasia.  -The atypical nevus appears narrowly excised in the examined planes of section    More frequent follow-up given history of dysplastic nevi every 4-5 months continue  SKs skin tags along bra line, shoulders reassurance    Right tan-brown 3 mm papule slightly darker periphery at right forearm recommend observation.  Extensive junctional nevi benign-appearing nevi benign patterns on dermoscopy lesion remains stable  Abdomen lesion stable    HSV as needed flare Valtrex 2 tabs, 12 hours later 2 tabs.  Flare let us know if worsening symptoms consider longer course    Biopsy left upper back atypical lentiginous nevus with mild dysplasia-excised 6/22  No recurrence.  Overall doing well   left anterior axilla,compound nevus right lateral lower leg Dermatofibroma 6/22      History of lentigines, melasma stable.  Continue sun protection over-the-counter retinol  Follow-up more frequently if lesions are atypical.  Ideally q. 4 to 6 months  Extensive nevi, many junctional nevi over the chest back arms.  In particular multiple lesions over the lumbar back CMAP observe.    Dermal papular nodule consistent with dermatofibroma at right lateral leg.  Multiple nevi over the toes feet.  Recommend observation.    Lesion at left mid back 4 x 6 mm with slightly retiform border.  Observe.  Darker focus at inferior and central border of lesion.  Benign pattern on dermoscopy careful monitoring of this lesion in particular lesion appears unchanged    Extensive sun damage encourage sunscreen sun protection regular follow-up ideally recheck lesion in 4 months  Please refer to map for specific lesions.  See additional diagnoses.  Pros cons of various therapies, risks benefits discussed.Pathophysiology discussed with patient.  Therapeutic options reviewed.  See  Medications in grid.  Instructions reviewed at length.    Benign nevi, seborrheic  keratoses, cherry  angiomas:  Reassurance regarding other benign skin lesions.Signs and symptoms of skin cancer, ABCDE's of melanoma discussed with patient. Sunscreen use, sun protection, self exams reviewed.  Followup as noted RTC routine checkup4- 6 mosor p.r.n.    Encounter Times new patient  Including precharting, reviewing chart, prior notes obtaining history: 10 minutes, medical exam :10 minutes, notes on body map, plan, counseling 10minutes My total time spent caring for the patient on the day of the encounter: 30 minutes     The patient indicates understanding of these issues and agrees to the plan.  The patient is asked to return as noted in follow-up/ above.    This note was generated using Dragon voice recognition software.  Please contact me regarding any confusion resulting from errors in recognition.

## 2024-05-23 ENCOUNTER — HOSPITAL ENCOUNTER (OUTPATIENT)
Dept: MAMMOGRAPHY | Age: 44
Discharge: HOME | End: 2024-05-23
Attending: FAMILY MEDICINE

## 2024-05-23 DIAGNOSIS — Z12.31 ENCOUNTER FOR SCREENING MAMMOGRAM FOR MALIGNANT NEOPLASM OF BREAST: ICD-10-CM

## 2024-05-23 PROCEDURE — 77063 BREAST TOMOSYNTHESIS BI: CPT | Performed by: FAMILY MEDICINE

## 2024-05-23 PROCEDURE — 77067 SCR MAMMO BI INCL CAD: CPT | Performed by: FAMILY MEDICINE

## 2024-07-19 ENCOUNTER — OFFICE VISIT (OUTPATIENT)
Dept: FAMILY MEDICINE CLINIC | Facility: CLINIC | Age: 44
End: 2024-07-19

## 2024-07-19 VITALS
HEIGHT: 63 IN | SYSTOLIC BLOOD PRESSURE: 109 MMHG | DIASTOLIC BLOOD PRESSURE: 70 MMHG | BODY MASS INDEX: 23.21 KG/M2 | HEART RATE: 65 BPM | WEIGHT: 131 LBS

## 2024-07-19 DIAGNOSIS — J01.10 ACUTE NON-RECURRENT FRONTAL SINUSITIS: Primary | ICD-10-CM

## 2024-07-19 RX ORDER — AMOXICILLIN 875 MG/1
875 TABLET, COATED ORAL 2 TIMES DAILY
Qty: 20 TABLET | Refills: 0 | Status: SHIPPED | OUTPATIENT
Start: 2024-07-19 | End: 2024-07-29

## 2024-07-19 NOTE — PROGRESS NOTES
HPI:     Headache   This is a new problem. Episode onset: 4 days. The problem occurs intermittently. The pain is located in the Frontal region. The pain does not radiate. The pain quality is not similar to prior headaches. The quality of the pain is described as aching. Associated symptoms include sinus pressure. Pertinent negatives include no abdominal pain, blurred vision, coughing, dizziness, ear pain, eye pain, fever, nausea, rhinorrhea, sore throat or vomiting. She has tried NSAIDs and acetaminophen for the symptoms. The treatment provided mild relief.       Medications:     Current Outpatient Medications   Medication Sig Dispense Refill    amoxicillin 875 MG Oral Tab Take 1 tablet (875 mg total) by mouth 2 (two) times daily for 10 days. 20 tablet 0    fluticasone propionate 50 MCG/ACT Nasal Suspension       Cetirizine HCl 5 MG Oral Tab Take 1 tablet (5 mg total) by mouth daily. (Patient not taking: Reported on 2024)         Allergies:   No Known Allergies    History:     Health Maintenance   Topic Date Due    Annual Physical  Never done    Colorectal Cancer Screening  Never done    COVID-19 Vaccine ( season) Never done    Annual Depression Screening  2024    Influenza Vaccine (1) 10/01/2024    Pap Smear  2025    Mammogram  2025    DTaP,Tdap,and Td Vaccines (2 - Td or Tdap) 2031    Pneumococcal Vaccine: Birth to 64yrs  Aged Out       No LMP recorded (lmp unknown). (Menstrual status: IUD - Intrauterine Device).   Past Medical History:     Past Medical History:    Compound nevus    back and axilla    Dermatofibroma    right leg    Dysplastic nevus    Left lower abdomen    Recurrent UTI (urinary tract infection)       Past Surgical History:     Past Surgical History:   Procedure Laterality Date          13 hr labor  36 week 5 lb(s) 3 oz Male \"Merrick\"  JLK  PIH, A1GDM      2010    CAP ([facility])      2012      APGAR:9 (1 min) 9 (5 min)       Family  History:     Family History   Problem Relation Age of Onset    Hypertension Mother     Breast Cancer Mother 69    Melanoma Mother             Other (Other) Father 46        mva death.    Breast Cancer Sister 47    Diabetes Maternal Grandmother     Arthritis Maternal Grandmother     Breast Cancer Paternal Aunt 55         from complications of breast cancer    Breast Cancer Paternal Aunt 55         from complications of breast cancer       Social History:     Social History     Socioeconomic History    Marital status:      Spouse name: Not on file    Number of children: Not on file    Years of education: Not on file    Highest education level: Not on file   Occupational History    Not on file   Tobacco Use    Smoking status: Former     Current packs/day: 0.00     Average packs/day: 0.3 packs/day for 8.0 years (2.0 ttl pk-yrs)     Types: Cigarettes     Start date: 6/15/1998     Quit date: 6/15/2006     Years since quittin.1     Passive exposure: Never    Smokeless tobacco: Never    Tobacco comments:     quit    Vaping Use    Vaping status: Never Used   Substance and Sexual Activity    Alcohol use: Yes     Alcohol/week: 0.0 standard drinks of alcohol     Comment: OCC    Drug use: No    Sexual activity: Yes     Partners: Male     Birth control/protection: Mirena, I.U.D.   Other Topics Concern     Service Not Asked    Blood Transfusions Not Asked    Caffeine Concern Yes     Comment: coffee 1 cup daily    Occupational Exposure Not Asked    Hobby Hazards Not Asked    Sleep Concern Not Asked    Stress Concern Not Asked    Weight Concern Not Asked    Special Diet Not Asked    Back Care Not Asked    Exercise Not Asked    Bike Helmet Not Asked    Seat Belt Not Asked    Self-Exams Not Asked    Grew up on a farm Not Asked    History of tanning Yes    Outdoor occupation No    Breast feeding No    Reaction to local anesthetic No    Pt has a pacemaker No    Pt has a defibrillator No   Social  History Narrative    Not on file     Social Determinants of Health     Financial Resource Strain: Not on file   Food Insecurity: Not on file   Transportation Needs: Not on file   Physical Activity: Not on file   Stress: Not on file   Social Connections: Not on file   Housing Stability: Not on file       Review of Systems:   Review of Systems   Constitutional:  Negative for activity change, chills, fatigue and fever.   HENT:  Positive for sinus pressure. Negative for congestion, ear discharge, ear pain, postnasal drip, rhinorrhea, sinus pain and sore throat.    Eyes:  Negative for blurred vision and pain.   Respiratory:  Negative for cough, chest tightness, shortness of breath and wheezing.    Cardiovascular:  Negative for chest pain and palpitations.   Gastrointestinal:  Negative for abdominal distention, abdominal pain, blood in stool, constipation, diarrhea, nausea and vomiting.   Skin:  Negative for rash.   Neurological:  Positive for headaches. Negative for dizziness.        Vitals:    07/19/24 1310   BP: 109/70   Pulse: 65   Weight: 131 lb (59.4 kg)   Height: 5' 3\" (1.6 m)     Body mass index is 23.21 kg/m².    Physical Exam:   Physical Exam  Constitutional:       General: She is not in acute distress.     Appearance: She is well-developed.   HENT:      Head: Normocephalic and atraumatic.      Right Ear: Tympanic membrane, ear canal and external ear normal. There is no impacted cerumen.      Left Ear: Tympanic membrane, ear canal and external ear normal. There is no impacted cerumen.      Nose:      Right Sinus: Frontal sinus tenderness present. No maxillary sinus tenderness.      Left Sinus: Frontal sinus tenderness present. No maxillary sinus tenderness.      Mouth/Throat:      Mouth: Mucous membranes are moist.      Pharynx: Oropharynx is clear. No oropharyngeal exudate or posterior oropharyngeal erythema.   Eyes:      General:         Right eye: No discharge.         Left eye: No discharge.       Conjunctiva/sclera: Conjunctivae normal.   Cardiovascular:      Rate and Rhythm: Normal rate and regular rhythm.      Heart sounds: Normal heart sounds, S1 normal and S2 normal. No murmur heard.  Pulmonary:      Effort: Pulmonary effort is normal.      Breath sounds: Normal breath sounds. No wheezing or rales.   Chest:      Chest wall: No tenderness.   Lymphadenopathy:      Cervical: No cervical adenopathy.   Skin:     Findings: No rash.   Neurological:      Mental Status: She is alert and oriented to person, place, and time.   Psychiatric:         Behavior: Behavior is cooperative.          Assessment and Plan::     Problem List Items Addressed This Visit    None  Visit Diagnoses       Acute non-recurrent frontal sinusitis    -  Primary    Relevant Medications    amoxicillin 875 MG Oral Tab        Advise patient to take Tylenol or Ibuprofen as needed for pain.    Discussed plan of care with pt and pt is in agreement.All questions answered. Pt to call with questions or concerns.

## 2024-08-02 ENCOUNTER — HOSPITAL ENCOUNTER (OUTPATIENT)
Dept: GENERAL RADIOLOGY | Age: 44
Discharge: HOME | End: 2024-08-02
Attending: FAMILY MEDICINE
Payer: COMMERCIAL

## 2024-08-02 ENCOUNTER — OFFICE VISIT (OUTPATIENT)
Dept: FAMILY MEDICINE CLINIC | Facility: CLINIC | Age: 44
End: 2024-08-02

## 2024-08-02 VITALS
SYSTOLIC BLOOD PRESSURE: 127 MMHG | DIASTOLIC BLOOD PRESSURE: 81 MMHG | BODY MASS INDEX: 22.86 KG/M2 | HEIGHT: 63 IN | WEIGHT: 129 LBS | HEART RATE: 63 BPM | TEMPERATURE: 99 F

## 2024-08-02 DIAGNOSIS — Z12.11 ENCOUNTER FOR SCREENING COLONOSCOPY: ICD-10-CM

## 2024-08-02 DIAGNOSIS — J02.9 SORE THROAT: Primary | ICD-10-CM

## 2024-08-02 DIAGNOSIS — R05.1 ACUTE COUGH: ICD-10-CM

## 2024-08-02 LAB
CONTROL LINE PRESENT WITH A CLEAR BACKGROUND (YES/NO): YES YES/NO
KIT LOT #: NORMAL NUMERIC
STREP GRP A CUL-SCR: POSITIVE

## 2024-08-02 PROCEDURE — 99213 OFFICE O/P EST LOW 20 MIN: CPT | Performed by: FAMILY MEDICINE

## 2024-08-02 PROCEDURE — 71046 X-RAY EXAM CHEST 2 VIEWS: CPT | Performed by: FAMILY MEDICINE

## 2024-08-02 PROCEDURE — 87880 STREP A ASSAY W/OPTIC: CPT | Performed by: FAMILY MEDICINE

## 2024-08-02 RX ORDER — AZITHROMYCIN 500 MG/1
500 TABLET, FILM COATED ORAL ONCE
COMMUNITY
Start: 2024-07-27

## 2024-08-02 RX ORDER — AMOXICILLIN AND CLAVULANATE POTASSIUM 875; 125 MG/1; MG/1
1 TABLET, FILM COATED ORAL 2 TIMES DAILY
Qty: 20 TABLET | Refills: 0 | Status: SHIPPED | OUTPATIENT
Start: 2024-08-02 | End: 2024-08-12

## 2024-08-02 NOTE — PROGRESS NOTES
Blood pressure 127/81, pulse 63, temperature 98.5 °F (36.9 °C), temperature source Temporal, height 5' 3\" (1.6 m), weight 129 lb (58.5 kg), not currently breastfeeding.      3 right complaining of nasal congestion with headache and sore throat for the past several days.  She was on amoxicillin last week and weekend then was put on a Z-Jere for sore throat.  Some coughing no nocturnal cough.  Nose very congested.  No fever but some chills.  No smoking or asthma history.  Taking Sudafed with minimal relief.    Objective throat erythematous no exudate rapid strep positive    Lungs clear to auscultation no rales rhonchi or wheezes    Right ear with small air-fluid level noted tympanic membrane small effusion left ear TM intact no redness    Assessment #1 cough #2 rapid strep positive #3 chills    Plan #1 and #3 stat chest x-ray ordered #2 strep sent for culture    Afrin for nasal congestion will follow with chest x-ray results

## 2024-08-03 LAB — SARS-COV-2 RNA RESP QL NAA+PROBE: DETECTED

## 2024-08-09 ENCOUNTER — OFFICE VISIT (OUTPATIENT)
Dept: OBGYN CLINIC | Facility: CLINIC | Age: 44
End: 2024-08-09

## 2024-08-09 VITALS
SYSTOLIC BLOOD PRESSURE: 120 MMHG | BODY MASS INDEX: 23 KG/M2 | DIASTOLIC BLOOD PRESSURE: 76 MMHG | HEART RATE: 53 BPM | WEIGHT: 128.5 LBS

## 2024-08-09 DIAGNOSIS — Z12.31 ENCOUNTER FOR SCREENING MAMMOGRAM FOR MALIGNANT NEOPLASM OF BREAST: ICD-10-CM

## 2024-08-09 DIAGNOSIS — N39.3 STRESS INCONTINENCE: ICD-10-CM

## 2024-08-09 DIAGNOSIS — Z01.419 WELL WOMAN EXAM WITH ROUTINE GYNECOLOGICAL EXAM: Primary | ICD-10-CM

## 2024-08-09 PROCEDURE — 99396 PREV VISIT EST AGE 40-64: CPT | Performed by: OBSTETRICS & GYNECOLOGY

## 2024-08-09 NOTE — PROGRESS NOTES
Mary Bowden is a 44 year old female  No LMP recorded (lmp unknown). (Menstrual status: IUD - Intrauterine Device).   Chief Complaint   Patient presents with    Gyn Exam     ANNUAL EXAM    Presenting for well woman exam. Last pap smear was normal 2022. Last mammogram was normal 2024. Mirena IUD in place since . Has irregular spotting with IUD. She reports that she has been having increased feelings of PMS and of night sweats. Discussed option for Estroven or OCP. She would like to start with Estroven. She also reports stress urinary incontinence.   Discussed option for therapy referral for PMS feelings, but she would like to start with Estroven. She will message if she wants referral.     OBSTETRICS HISTORY:  OB History    Para Term  AB Living   3 3 2 1 0 3   SAB IAB Ectopic Multiple Live Births   0 0 0 0 3       GYNE HISTORY:  No LMP recorded (lmp unknown). (Menstrual status: IUD - Intrauterine Device).    History   Sexual Activity    Sexual activity: Yes    Partners: Male    Birth control/ protection: Mirena, I.U.D.        Menarche: 12  Period Cycle (Days): ABSENT DUE TO MIRENA  Use of Birth Control (if yes, specify type): MIRENA 14  Hx Prior Abnormal Pap: No  Pap Date: 22  Pap Result Notes: NEGATIVE & HPV NEGATIVE  Follow Up Recommendation: MAMMO 24 RIKA NEG      MEDICAL HISTORY:  Past Medical History:    Compound nevus    back and axilla    Dermatofibroma    right leg    Dysplastic nevus    Left lower abdomen    Recurrent UTI (urinary tract infection)         SURGICAL HISTORY:  Past Surgical History:   Procedure Laterality Date          13 hr labor  36 week 5 lb(s) 3 oz Male \"Merrick\"  JLK  PIH, A1GDM      2010    CAP ([facility])      2012      APGAR:9 (1 min) 9 (5 min)       SOCIAL HISTORY:  Social History     Socioeconomic History    Marital status:      Spouse name: Not on file    Number of children: Not on file    Years of  education: Not on file    Highest education level: Not on file   Occupational History    Not on file   Tobacco Use    Smoking status: Former     Current packs/day: 0.00     Average packs/day: 0.3 packs/day for 8.0 years (2.0 ttl pk-yrs)     Types: Cigarettes     Start date: 6/15/1998     Quit date: 6/15/2006     Years since quittin.1     Passive exposure: Never    Smokeless tobacco: Never    Tobacco comments:     quit 2006   Vaping Use    Vaping status: Never Used   Substance and Sexual Activity    Alcohol use: Yes     Alcohol/week: 0.0 standard drinks of alcohol     Comment: OCC    Drug use: No    Sexual activity: Yes     Partners: Male     Birth control/protection: Mirena, I.U.D.   Other Topics Concern     Service Not Asked    Blood Transfusions Not Asked    Caffeine Concern Yes     Comment: coffee 1 cup daily    Occupational Exposure Not Asked    Hobby Hazards Not Asked    Sleep Concern Not Asked    Stress Concern Not Asked    Weight Concern Not Asked    Special Diet Not Asked    Back Care Not Asked    Exercise Not Asked    Bike Helmet Not Asked    Seat Belt Not Asked    Self-Exams Not Asked    Grew up on a farm Not Asked    History of tanning Yes    Outdoor occupation No    Breast feeding No    Reaction to local anesthetic No    Pt has a pacemaker No    Pt has a defibrillator No   Social History Narrative    Not on file     Social Determinants of Health     Financial Resource Strain: Not on file   Food Insecurity: Not on file   Transportation Needs: Not on file   Physical Activity: Not on file   Stress: Not on file   Social Connections: Not on file   Housing Stability: Not on file         Depression Screening (PHQ-2/PHQ-9): Over the LAST 2 WEEKS   Little interest or pleasure in doing things (over the last two weeks)?: Not at all    Feeling down, depressed, or hopeless (over the last two weeks)?: Several days    PHQ-2 SCORE: 1           MEDICATIONS:    Current Outpatient Medications:     amoxicillin  clavulanate 875-125 MG Oral Tab, Take 1 tablet by mouth 2 (two) times daily for 10 days., Disp: 20 tablet, Rfl: 0    fluticasone propionate 50 MCG/ACT Nasal Suspension, , Disp: , Rfl:     Cetirizine HCl 5 MG Oral Tab, Take 1 tablet (5 mg total) by mouth daily., Disp: , Rfl:     azithromycin 500 MG Oral Tab, Take 1 tablet (500 mg total) by mouth once. Once for 3 days (Patient not taking: Reported on 8/9/2024), Disp: , Rfl:     ALLERGIES:  No Known Allergies      Review of Systems:  Review of Systems   All other systems reviewed and are negative.       Vitals:    08/09/24 1117   BP: 120/76   Pulse: 53       PHYSICAL EXAM:   Physical Exam  Vitals reviewed.   Constitutional:       Appearance: Normal appearance.   HENT:      Head: Atraumatic.   Eyes:      Pupils: Pupils are equal, round, and reactive to light.   Pulmonary:      Effort: Pulmonary effort is normal.   Chest:   Breasts:     Right: Normal. No bleeding, inverted nipple, mass, nipple discharge, skin change or tenderness.      Left: Normal. No bleeding, inverted nipple, mass, nipple discharge, skin change or tenderness.   Abdominal:      General: Abdomen is flat.      Palpations: Abdomen is soft.      Tenderness: There is no abdominal tenderness.   Genitourinary:     General: Normal vulva.      Exam position: Lithotomy position.      Labia:         Right: No rash, tenderness, lesion or injury.         Left: No rash, tenderness, lesion or injury.       Vagina: Normal.      Cervix: Normal.      Uterus: Normal. Not tender.       Adnexa: Right adnexa normal and left adnexa normal.        Right: No tenderness or fullness.          Left: No tenderness or fullness.        Comments: IUD strings visualized  Lymphadenopathy:      Upper Body:      Right upper body: No supraclavicular, axillary or pectoral adenopathy.      Left upper body: No supraclavicular, axillary or pectoral adenopathy.   Skin:     General: Skin is warm and dry.   Neurological:      General: No focal  deficit present.      Mental Status: She is alert and oriented to person, place, and time.   Psychiatric:         Mood and Affect: Mood normal.         Behavior: Behavior normal.         Thought Content: Thought content normal.         Judgment: Judgment normal.           Assessment & Plan:  Mary was seen today for gyn exam.    Diagnoses and all orders for this visit:    Well woman exam with routine gynecological exam    Encounter for screening mammogram for malignant neoplasm of breast  -     [facility] DAYANARA 2D+3D SCREENING BILAT (CPT=77067/72156); Future    Stress incontinence  -     Pelvic Floor Therapy - [place] Location        Requested Prescriptions      No prescriptions requested or ordered in this encounter       Next cotest due 2025 . ASSCP guidelines reviewed.  Annual exams encouraged. Call if any abnormal vaginal bleeding.  Mammogram order given.  SBE encouraged. Pelvic Floor PT referral sent. Recommend starting Estroven.

## 2024-08-22 ENCOUNTER — OFFICE VISIT (OUTPATIENT)
Dept: FAMILY MEDICINE CLINIC | Facility: CLINIC | Age: 44
End: 2024-08-22

## 2024-08-22 VITALS
HEIGHT: 63 IN | DIASTOLIC BLOOD PRESSURE: 83 MMHG | SYSTOLIC BLOOD PRESSURE: 128 MMHG | WEIGHT: 131 LBS | HEART RATE: 65 BPM | BODY MASS INDEX: 23.21 KG/M2

## 2024-08-22 DIAGNOSIS — J02.9 PHARYNGITIS, UNSPECIFIED ETIOLOGY: Primary | ICD-10-CM

## 2024-08-22 PROCEDURE — 99213 OFFICE O/P EST LOW 20 MIN: CPT | Performed by: FAMILY MEDICINE

## 2024-08-22 PROCEDURE — 87880 STREP A ASSAY W/OPTIC: CPT | Performed by: FAMILY MEDICINE

## 2024-08-22 NOTE — PROGRESS NOTES
Blood pressure 128/83, pulse 65, height 5' 3\" (1.6 m), weight 131 lb (59.4 kg), not currently breastfeeding.          Patient presents today reporting that she has some swelling around the neck 4 days ago which has receded.  Some throat discomfort at this time but no dysphonia no dysphagia recently treated for strep.    Objective throat erythematous no exudate    No enlarged cervical nodes no thyromegaly    Assessment recent history of strep some swelling about the neck now improved    Plan strep swab sent will follow with results encouraged colonoscopy

## 2024-09-26 ENCOUNTER — NURSE ONLY (OUTPATIENT)
Facility: CLINIC | Age: 44
End: 2024-09-26

## 2024-09-26 DIAGNOSIS — Z12.11 SCREEN FOR COLON CANCER: Primary | ICD-10-CM

## 2024-09-26 NOTE — PROGRESS NOTES
Called patient for scheduled TCS.   Medications, pharmacy, and allergies reviewed.   Please advise on colonoscopy and bowel prep orders.     Age [age] y/o:   › MD preference: n/a  › Insurance: [employer]  › Last pcp Visit: 8/22/24  › H/W/BMI: 5'3\"/131lbs/23.21    Special comments/notes:  Telephone Colon Screening Questionnaire Yes No   Are you currently experiencing any new/abnormal GI symptoms? [] [x]   If yes, explain:     Rectal bleeding? [] [x]   Black stool? [] [x]   Dysphagia or food \"feeling stuck\" when eating? [] [x]   Intractable vomiting? [] [x]   Unexplained weight loss? [] [x]   First colonoscopy? [x] []   Family history of colon cancer? [] [x]   Any issues with anesthesia? [] [x]   If yes, explain:      Any recent complaints of chest pain or shortness of breath?  [] [x]   Referred to a cardiologist?  [] [x]   If yes, explain:      Stroke, heart attack or stent placement in the last 12 months:  [] [x]   History of  respiratory issues/oxygen/NIKOLAY/COPD: [] [x]   If yes, CPAP/BiPAP:     History of devices (pacemaker/defibrillator) [] [x]   History of cardiac/CVA/(MI/stroke): [] [x]     Medications Yes  No   Anticoagulants (except Aspirin):  [] [x]   Diabetic Meds [] [x]   Weight loss meds (phentermine/vyvanse/saxsenda/etc): [] [x]   Iron/herbal/multivitamin supplement: [] [x]   Usage of marijuana, CBD &/or vape products: [] [x]

## 2024-09-26 NOTE — PROGRESS NOTES
GI Staff:  TCS Colon Screening Orders    Please schedule: Colonoscopy [id number] with MAC OR IV (if appropriate)    Please send split dose Golytely bowel prep     Diagnosis: Colon Screening Z12.11     Medication adjustments:  Day before procedure, hold: n/a  Day of procedure, hold: n/a    >>>Please inform patient if new medications are started after scheduling procedure they need to call clinic to notify us.

## 2024-09-30 ENCOUNTER — OFFICE VISIT (OUTPATIENT)
Dept: DERMATOLOGY CLINIC | Facility: CLINIC | Age: 44
End: 2024-09-30

## 2024-09-30 DIAGNOSIS — D23.9 BENIGN NEOPLASM OF SKIN, UNSPECIFIED LOCATION: ICD-10-CM

## 2024-09-30 DIAGNOSIS — L81.4 LENTIGO: ICD-10-CM

## 2024-09-30 DIAGNOSIS — Z87.898 HISTORY OF ATYPICAL NEVUS: ICD-10-CM

## 2024-09-30 DIAGNOSIS — D22.9 MULTIPLE NEVI: Primary | ICD-10-CM

## 2024-09-30 DIAGNOSIS — Z13.89 ENCOUNTER FOR SURVEILLANCE OF ABNORMAL NEVI: ICD-10-CM

## 2024-09-30 DIAGNOSIS — Z80.8 FAMILY HISTORY OF MELANOMA: ICD-10-CM

## 2024-09-30 PROCEDURE — 99213 OFFICE O/P EST LOW 20 MIN: CPT | Performed by: DERMATOLOGY

## 2024-09-30 RX ORDER — TRETINOIN 0.5 MG/G
CREAM TOPICAL
Qty: 20 G | Refills: 3 | Status: SHIPPED | OUTPATIENT
Start: 2024-09-30

## 2024-09-30 NOTE — PROGRESS NOTES
Called patient twice to schedule colonoscopy received a busy signal twice if patient calls back please transfer to GI schedulers

## 2024-10-12 NOTE — PROGRESS NOTES
Mary Bowden is a 44 year old female.  HPI:     CC:    Chief Complaint   Patient presents with    Full Skin Exam     LOV . Pt present for full body exam. Pt has hx of nevi, mother has hx of melanoma. Pt denies any concern         Allergies:  Patient has no known allergies.    HISTORY:    Past Medical History:    Allergic rhinitis    Compound nevus    back and axilla    Dermatofibroma    right leg    Dysplastic nevus    Left lower abdomen    Recurrent UTI (urinary tract infection)      Past Surgical History:   Procedure Laterality Date      2002    13 hr labor  36 week 5 lb(s) 3 oz Male \"Merrick\"  JLK  PIH, A1GDM      2010    CAP ([facility])      2012      APGAR:9 (1 min) 9 (5 min)      Family History   Problem Relation Age of Onset    Hypertension Mother     Breast Cancer Mother 69    Melanoma Mother             Cancer Mother     Other (Other) Father 46        mva death.    Breast Cancer Sister 47    Diabetes Maternal Grandmother     Arthritis Maternal Grandmother     Breast Cancer Paternal Aunt 55         from complications of breast cancer    Breast Cancer Paternal Aunt 55         from complications of breast cancer      Social History     Socioeconomic History    Marital status:    Tobacco Use    Smoking status: Former     Current packs/day: 0.00     Average packs/day: 0.3 packs/day for 8.0 years (2.0 ttl pk-yrs)     Types: Cigarettes     Start date: 6/15/1998     Quit date: 6/15/2006     Years since quittin.3     Passive exposure: Never    Smokeless tobacco: Never    Tobacco comments:     quit 2006   Vaping Use    Vaping status: Never Used   Substance and Sexual Activity    Alcohol use: Yes     Comment: OCC    Drug use: No    Sexual activity: Yes     Partners: Male     Birth control/protection: Mirena, I.U.D.   Other Topics Concern    Caffeine Concern Yes     Comment: coffee 1 cup daily    Grew up on a farm No    History of tanning Yes    Outdoor occupation No     Breast feeding No    Reaction to local anesthetic No    Pt has a pacemaker No    Pt has a defibrillator No        Current Outpatient Medications   Medication Sig Dispense Refill    Tretinoin 0.05 % External Cream Apply a small amount to areas of acne as directed at bedtime. 20 g 3    fluticasone propionate 50 MCG/ACT Nasal Suspension       Cetirizine HCl 5 MG Oral Tab Take 1 tablet (5 mg total) by mouth daily.      azithromycin 500 MG Oral Tab Take 1 tablet (500 mg total) by mouth once. Once for 3 days (Patient not taking: Reported on 2024)       Allergies:   No Known Allergies    Past Medical History:    Allergic rhinitis    Compound nevus    back and axilla    Dermatofibroma    right leg    Dysplastic nevus    Left lower abdomen    Recurrent UTI (urinary tract infection)     Past Surgical History:   Procedure Laterality Date          13 hr labor  36 week 5 lb(s) 3 oz Male \"Merrick\"  JLK  PIH, A1GDM      2010    CAP ([facility])      2012      APGAR:9 (1 min) 9 (5 min)     Social History     Socioeconomic History    Marital status:      Spouse name: Not on file    Number of children: Not on file    Years of education: Not on file    Highest education level: Not on file   Occupational History    Not on file   Tobacco Use    Smoking status: Former     Current packs/day: 0.00     Average packs/day: 0.3 packs/day for 8.0 years (2.0 ttl pk-yrs)     Types: Cigarettes     Start date: 6/15/1998     Quit date: 6/15/2006     Years since quittin.3     Passive exposure: Never    Smokeless tobacco: Never    Tobacco comments:     quit 2006   Vaping Use    Vaping status: Never Used   Substance and Sexual Activity    Alcohol use: Yes     Comment: OCC    Drug use: No    Sexual activity: Yes     Partners: Male     Birth control/protection: Mirena, I.U.D.   Other Topics Concern     Service Not Asked    Blood Transfusions Not Asked    Caffeine Concern Yes     Comment: coffee 1 cup daily     Occupational Exposure Not Asked    Hobby Hazards Not Asked    Sleep Concern Not Asked    Stress Concern Not Asked    Weight Concern Not Asked    Special Diet Not Asked    Back Care Not Asked    Exercise Not Asked    Bike Helmet Not Asked    Seat Belt Not Asked    Self-Exams Not Asked    Grew up on a farm No    History of tanning Yes    Outdoor occupation No    Breast feeding No    Reaction to local anesthetic No    Pt has a pacemaker No    Pt has a defibrillator No   Social History Narrative    Not on file     Social Drivers of Health     Financial Resource Strain: Not on file   Food Insecurity: Not on file   Transportation Needs: Not on file   Physical Activity: Not on file   Stress: Not on file   Social Connections: Not on file   Housing Stability: Not on file     Family History   Problem Relation Age of Onset    Hypertension Mother     Breast Cancer Mother 69    Melanoma Mother             Cancer Mother     Other (Other) Father 46        mva death.    Breast Cancer Sister 47    Diabetes Maternal Grandmother     Arthritis Maternal Grandmother     Breast Cancer Paternal Aunt 55         from complications of breast cancer    Breast Cancer Paternal Aunt 55         from complications of breast cancer       There were no vitals filed for this visit.    HPI:    Chief Complaint   Patient presents with    Full Skin Exam     LOV . Pt present for full body exam. Pt has hx of nevi, mother has hx of melanoma. Pt denies any concern     Follow-up skin exam patient with history of atypical nevi.  No particular lesions of concern at this time.  No changing pigmented lesions noted by patient    Patient's mother with history of melanoma.    Patient with history of excessive sun exposure, tanning bed remotely.  Now uses sunscreen consistently    Patient presents with concerns above.    Patient has been in their usual state of health.  History, medications, allergies reviewed as noted.      ROS:  Denies any other  systemic complaints.  No new or changeing lesions other than noted above. No fevers, chills, night sweats, unusual sun sensitivity.  No other skin complaints.        History, medications, allergies reviewed as noted.       Physical Examination:     Well-developed well-nourished patient alert oriented in no acute distress.  Exam total-body performed, including scalp, head, neck, face,nails, hair, external eyes, including conjunctival mucosa, eyelids, lips external ears, back, chest,/ breasts, axillae,  abdomen, arms, legs, palms.     Multiple light to medium brown, well marginated, uniformly pigmented, macules and papules 6 mm and less scattered on exam. pigmented lesions examined with dermoscopy benign-appearing patterns.     Waxy tannish keratotic papules scattered, cherry-red vascular papules scattered.    See map today's date for lesions noted .      Otherwise remarkable for lesions as noted on map.  See details of examination  See Assessment /Plan for additional history and physical exam also:    Assessment / plan:    No orders of the defined types were placed in this encounter.      Meds & Refills for this Visit:  Requested Prescriptions     Signed Prescriptions Disp Refills    Tretinoin 0.05 % External Cream 20 g 3     Sig: Apply a small amount to areas of acne as directed at bedtime.         Encounter Diagnoses   Name Primary?    Multiple nevi Yes    History of atypical nevus     Lentigo     Family history of melanoma     Encounter for surveillance of abnormal nevi     Benign neoplasm of skin, unspecified location        See details on map.      Remarkable for:    Patient seen for follow-up long-term monitoring, treatment of  Atypical nevi, family history melanoma  Plan of care:  ongoing surveillance, monitoring including regular follow-up due to longer term risk of recurrence, new lesions.  See previous notes.  There is a longitudinal care relationship with me, the care plan reflects the ongoing nature of  the continuous relationship of care, and the medical record indicates that there is ongoing treatment of a serious/complex medical condition which I am currently managing.  is Applicable        Speckled lesion at right medial distal thigh monitor 4 mm    Follow-up atypical nevus no recurrence  left lower abdomen,11/23  -Atypical compound lentiginous nevus with mild melanocytic dysplasia.  -The atypical nevus appears narrowly excised in the examined planes of section    More frequent follow-up given history of dysplastic nevi every 4-5 months continue  SKs skin tags along bra line, shoulders reassurance    Right tan-brown 3 mm papule slightly darker periphery at right forearm recommend observation.  Extensive junctional nevi benign-appearing nevi benign patterns on dermoscopy lesion remains stable  Abdomen lesion stable    HSV as needed flare Valtrex 2 tabs, 12 hours later 2 tabs.  Flare let us know if worsening symptoms consider longer course  Acne lesions intermittent chin jawline.  Tretinoin.  Application instructions reviewed  Retinoids: Application instructions reviewed gentle cleanser, over moisturizer.  Hyaluronic acid continue moisturizers may be helpful at reducing irritation.  Start 2-3 times weekly slowly titrate up, increase to nightly as tolerated May need to decrease use with cold weather.      Biopsy left upper back atypical lentiginous nevus with mild dysplasia-excised 6/22  No recurrence.  Overall doing well   left anterior axilla,compound nevus right lateral lower leg Dermatofibroma 6/22      History of lentigines, melasma stable.  Continue sun protection over-the-counter retinol  Discussed tretinoin for chemoprevention,also      Follow-up more frequently if lesions are atypical.  Ideally q. 4 to 6 months  Extensive nevi, many junctional nevi over the chest back arms.  In particular multiple lesions over the lumbar back CMAP observe.    Dermal papular nodule consistent with dermatofibroma at  right lateral leg.  Multiple nevi over the toes feet.  Recommend observation.    Lesion at left mid back 4 x 6 mm with slightly retiform border.  Observe.  Darker focus at inferior and central border of lesion.  Benign pattern on dermoscopy careful monitoring of this lesion in particular lesion appears unchanged    Extensive sun damage encourage sunscreen sun protection regular follow-up ideally recheck lesion in 4 months  Please refer to map for specific lesions.  See additional diagnoses.  Pros cons of various therapies, risks benefits discussed.Pathophysiology discussed with patient.  Therapeutic options reviewed.  See  Medications in grid.  Instructions reviewed at length.    Benign nevi, seborrheic  keratoses, cherry angiomas:  Reassurance regarding other benign skin lesions.Signs and symptoms of skin cancer, ABCDE's of melanoma discussed with patient. Sunscreen use, sun protection, self exams reviewed.  Followup as noted RTC routine checkup4- 6 mosor p.r.n.    Encounter Times new patient  Including precharting, reviewing chart, prior notes obtaining history: 10 minutes, medical exam :10 minutes, notes on body map, plan, counseling 10minutes My total time spent caring for the patient on the day of the encounter: 30 minutes     The patient indicates understanding of these issues and agrees to the plan.  The patient is asked to return as noted in follow-up/ above.    This note was generated using Dragon voice recognition software.  Please contact me regarding any confusion resulting from errors in recognition.

## 2024-10-21 NOTE — PROGRESS NOTES
[facility]       Please send prep         [id number] - [place], [place] - [address] AT SEC OF [address], [phone], [phone]   [address] [address]   Phone: [phone] Fax: [phone]

## 2024-10-21 NOTE — PROGRESS NOTES
Scheduled for:  Colonoscopy [id number]  Provider Name:    Date:  Thursday, 03/06/2025  Location:  [facility]  Sedation:  IV  Time:  10AM (pt is aware Endo will call with arrival time     Prep:  Golytely  Meds/Allergies Reconciled?:  Yes    Diagnosis with codes:  Colon Screening Z12.11   Was patient informed to call insurance with codes (Y/N):  Yes     Referral sent?:  Referral was sent at the time of electronic surgical scheduling.    EM or [facility] notified?:  I sent an electronic request to Endo Scheduling and received a confirmation today.       Medication Orders:  None  Misc Orders: None       Further instructions given by staff:  I discussed the prep instructions with the patient which she verbally understood and is aware that I will send the instructions today.via [employer]

## 2024-11-08 ENCOUNTER — OFFICE VISIT (OUTPATIENT)
Dept: FAMILY MEDICINE CLINIC | Facility: CLINIC | Age: 44
End: 2024-11-08

## 2024-11-08 VITALS
SYSTOLIC BLOOD PRESSURE: 125 MMHG | HEART RATE: 58 BPM | HEIGHT: 63 IN | WEIGHT: 134 LBS | BODY MASS INDEX: 23.74 KG/M2 | DIASTOLIC BLOOD PRESSURE: 81 MMHG

## 2024-11-08 DIAGNOSIS — M21.611 BUNION OF RIGHT FOOT: ICD-10-CM

## 2024-11-08 DIAGNOSIS — Z00.00 ROUTINE PHYSICAL EXAMINATION: Primary | ICD-10-CM

## 2024-11-08 DIAGNOSIS — M54.50 ACUTE BILATERAL LOW BACK PAIN WITHOUT SCIATICA: ICD-10-CM

## 2024-11-08 DIAGNOSIS — N92.0 MENORRHAGIA WITH REGULAR CYCLE: ICD-10-CM

## 2024-11-08 PROCEDURE — 99396 PREV VISIT EST AGE 40-64: CPT | Performed by: FAMILY MEDICINE

## 2024-11-08 NOTE — PROGRESS NOTES
REASON FOR VISIT:    Mary Bowden is a 44 year old female who presents for an Annual Health Assessment.        Patient Active Problem List   Diagnosis    IUD (intrauterine device) in place    Benign neoplasm of skin of trunk    Benign neoplasm of scalp and skin of neck    Family history of breast cancer     General Health     At any time do you feel concerned for the safety/well-being of yourself and/or your children, in your home or elsewhere?: No     CAGE:     Cut: Have you ever felt you should Cut down on your drinking?: No  Annoyed: Have people Annoyed you by criticizing your drinking?: No  Guilty: Have you ever felt bad or Guilty about your drinking?: No  Eye Opener: Have you ever had a drink first thing in the morning to steady your nerves or to get rid of a hangover (Eye opener)?: No  Scoring  Total Score: 0     Depression Screening (PHQ-2/PHQ-9):  Over the LAST 2 WEEKS             PREVENTATIVE SERVICES  INDICATIONS AND SCHEDULE Recommendation Internal Lab or Procedure   Colonoscopy Screen Every 10 years Health Maintenance   Topic Date Due    Colorectal Cancer Screening  Never done      Flex Sigmoidoscopy Screen  Every 5 years No results found for this or any previous visit.   Fecal Occult Blood  Annually No results found for: \"FOB\", \"OCCULTSTOOL\"   Obesity Screening Screen all adults annually Body mass index is 23.74 kg/m².     Preventive Services for Which Recommendations Vary with Risk Recommendation Internal Lab or Procedure   Cholesterol Screening Recommended screening varies with age, risk and gender LDL Cholesterol (mg/dL)   Date Value   05/13/2021 115 (H)      Diabetes Screening  If history of high blood pressure or other  risk factors No results found for: \"A1C\"  Glucose (mg/dL)   Date Value   05/13/2021 83        Gonorrhea Screening If high risk No results found for: \"GONOCOCCUS\"   HIV Screening For all adults age 18-65, older adults at increased risk No results found for: \"HIV\"   Syphilis Screening  Screen if pregnant or high risk RPR (no units)   Date Value   2012 Non-Reactive      Hepatitis C Screening Screen pts at high risk plus screen one time for adults born 1945 - 1965 No results found for: \"HCVAB\"   Tuberculosis Screen If high risk No components found for: \"PPDINDURAT\"       SPECIFIC DISEASE MONITORING Internal Lab or Procedure   No disease specific diagnoses    ALLERGIES:   Allergies[1]  CURRENT MEDICATIONS:   Current Outpatient Medications   Medication Sig Dispense Refill    fluticasone propionate 50 MCG/ACT Nasal Suspension       Cetirizine HCl 5 MG Oral Tab Take 1 tablet (5 mg total) by mouth daily.        MEDICAL INFORMATION:   Past Medical History:    Allergic rhinitis    Compound nevus    back and axilla    Dermatofibroma    right leg    Dysplastic nevus    Left lower abdomen    Recurrent UTI (urinary tract infection)      Past Surgical History:   Procedure Laterality Date          13 hr labor  36 week 5 lb(s) 3 oz Male \"Merrick\"  JLK  PIH, A1GDM      2010    CAP ([place])      2012      APGAR:9 (1 min) 9 (5 min)      Family History   Problem Relation Age of Onset    Hypertension Mother     Breast Cancer Mother 69    Melanoma Mother             Cancer Mother     Other (Other) Father 46        mva death.    Breast Cancer Sister 47    Diabetes Maternal Grandmother     Arthritis Maternal Grandmother     Breast Cancer Paternal Aunt 55         from complications of breast cancer    Breast Cancer Paternal Aunt 55         from complications of breast cancer     NO FAMILY HISTORY OF BREAST OR COLON CANCER.     SOCIAL HISTORY:   Social History     Socioeconomic History    Marital status:    Tobacco Use    Smoking status: Former     Current packs/day: 0.00     Average packs/day: 0.3 packs/day for 8.0 years (2.0 ttl pk-yrs)     Types: Cigarettes     Start date: 6/15/1998     Quit date: 6/15/2006     Years since quittin.4     Passive exposure: Never     Smokeless tobacco: Never    Tobacco comments:     quit 2006   Vaping Use    Vaping status: Never Used   Substance and Sexual Activity    Alcohol use: Yes     Comment: OCC    Drug use: No    Sexual activity: Yes     Partners: Male     Birth control/protection: Mirena, I.U.D.   Other Topics Concern    Caffeine Concern Yes     Comment: coffee 1 cup daily    Grew up on a farm No    History of tanning Yes    Outdoor occupation No    Breast feeding No    Reaction to local anesthetic No    Pt has a pacemaker No    Pt has a defibrillator No     Occ:  :       REVIEW OF SYSTEMS:   GENERAL: feels well otherwise  SKIN: denies any unusual skin lesions  EYES: denies blurred vision or double vision  HEENT: denies nasal congestion, sinus pain or ST  LUNGS: denies shortness of breath with exertion  CARDIOVASCULAR: denies chest pain on exertion  GI: denies abdominal pain, denies heartburn  : denies dysuria, vaginal discharge or itching, periods regular   MUSCULOSKELETAL has low  back pain  NEURO: denies headaches  PSYCHE: denies depression or anxiety  HEMATOLOGIC: denies hx of anemia  ENDOCRINE: denies thyroid history  ALL/ASTHMA: denies hx of allergy or asthma  NO BLOOD IN STOOL      EXAM:   /81 (BP Location: Left arm, Patient Position: Sitting, Cuff Size: adult)   Pulse 58   Ht 5' 3\" (1.6 m)   Wt 134 lb (60.8 kg)   LMP  (LMP Unknown)   BMI 23.74 kg/m²   >   BP Readings from Last 3 Encounters:   11/08/24 125/81   08/22/24 128/83   08/09/24 120/76     No LMP recorded (lmp unknown). (Menstrual status: IUD - Intrauterine Device).  GENERAL: well developed, well nourished, in no apparent distress   SKIN: no rashes, no suspicious lesions  HEENT: atraumatic, normocephalic, ears and throat are clear  EYES:PERRLA, EOMI, conjunctiva are clear.    NECK: supple, no adenopathy, no bruits  CHEST: no chest tenderness  BREAST: deferred   LUNGS: clear to auscultation  CARDIO: RRR without murmur  GI: good BS's, no masses, HSM or  tenderness  :deferred  RECTAL: deferred  MUSCULOSKELETAL: back is not tender, FROM of the back  EXTREMITIES: no cyanosis, clubbing or edema  NEURO: Oriented times three, cranial nerves are intact, motor and sensory are grossly intact      ASSESSMENT AND OTHER RELEVANT CHRONIC CONDITIONS:   Mary Bowden is a 44 year old female who presents for an Annual Health Assessment.     PLAN SUMMARY:   Diagnoses and all orders for this visit:    Routine physical examination    Menorrhagia with regular cycle  -     CBC With Differential With Platelet; Future  -     AST (SGOT); Future  -     ALT(SGPT); Future  -     Lipid Panel; Future  -     TSH W Reflex To Free T4; Future  -     Basic Metabolic Panel (8); Future    Bunion of right foot  -     XR FOOT, COMPLETE (MIN 3 VIEWS), RIGHT (CPT=73630); Future  -     Podiatry Referral - In Network    Acute bilateral low back pain without sciatica  -     Physical Therapy Referral - [facility]       The patient indicates understanding of these issues and agrees to the plan.  No follow-ups on file.  Exercise counseling perfomed    SUGGESTED VACCINATIONS - Influenza, Pneumococcal, Zoster, Tetanus, HPV   Influenza: Influenza Vaccine(1) due on 10/01/2024  Pneumonia: No recommendations at this time  HPV: No recommendations at this time  Tdap: No recommendations at this time  Shingles:      Influenza Annually   Pneumococcal if high risk   Td/Tdap once then every 10 years   HPV Males 11-21   Zoster (Shingles) 60 and older: one dose   Varicella 2 doses if not immune   MMR 1-2 doses if born after 1957 and not immune     1. Routine physical examination  Does not want flu shot    2. Menorrhagia with regular cycle  Has an IUD may be perimenopausal  - CBC With Differential With Platelet; Future  - AST (SGOT); Future  - ALT(SGPT); Future  - Lipid Panel; Future  - TSH W Reflex To Free T4; Future  - Basic Metabolic Panel (8); Future    3. Bunion of right foot  Causing more pain recently  - XR  FOOT, COMPLETE (MIN 3 VIEWS), RIGHT (CPT=73630); Future  - Podiatry Referral - In Network    4. Acute bilateral low back pain without sciatica  No leg pain or traumatic injury  - Physical Therapy Referral - [place] Locations           [1] No Known Allergies

## 2024-11-16 ENCOUNTER — APPOINTMENT (OUTPATIENT)
Dept: GENERAL RADIOLOGY | Age: 44
End: 2024-11-16
Attending: NURSE PRACTITIONER
Payer: COMMERCIAL

## 2024-11-16 ENCOUNTER — HOSPITAL ENCOUNTER (OUTPATIENT)
Age: 44
Discharge: HOME | End: 2024-11-16
Payer: COMMERCIAL

## 2024-11-16 VITALS
HEART RATE: 87 BPM | OXYGEN SATURATION: 100 % | RESPIRATION RATE: 16 BRPM | SYSTOLIC BLOOD PRESSURE: 118 MMHG | DIASTOLIC BLOOD PRESSURE: 78 MMHG | TEMPERATURE: 98 F

## 2024-11-16 DIAGNOSIS — M54.50 ACUTE BILATERAL LOW BACK PAIN, UNSPECIFIED WHETHER SCIATICA PRESENT: Primary | ICD-10-CM

## 2024-11-16 PROCEDURE — 72100 X-RAY EXAM L-S SPINE 2/3 VWS: CPT | Performed by: NURSE PRACTITIONER

## 2024-11-16 PROCEDURE — 99213 OFFICE O/P EST LOW 20 MIN: CPT

## 2024-11-16 PROCEDURE — 99214 OFFICE O/P EST MOD 30 MIN: CPT

## 2024-11-16 RX ORDER — DIAZEPAM 5 MG/1
5 TABLET ORAL 2 TIMES DAILY PRN
Qty: 10 TABLET | Refills: 0 | Status: SHIPPED | OUTPATIENT
Start: 2024-11-16

## 2024-11-16 RX ORDER — PREDNISONE 20 MG/1
40 TABLET ORAL DAILY
Qty: 10 TABLET | Refills: 0 | Status: SHIPPED | OUTPATIENT
Start: 2024-11-16 | End: 2024-11-21

## 2024-11-16 NOTE — ED PROVIDER NOTES
Patient Seen in: [facility] [place]      History     Chief Complaint   Patient presents with    Back Pain     Stated Complaint: lower back pain    Subjective:   HPI      This is a 44-year-old female with history of allergic rhinitis presenting with low back pain.  Patient states she has been having left lower back pain off and on for a while saw her primary care provider last Friday who attempted an adjustment on her back and did put in a referral for physical therapy.  Patient states she feels like the pain is progressively gotten worse.  Patient states she was cleaning the floor and got up and felt more pain with bending over and certain movements.  Patient states at times it feels like the pain in her lower back goes into her left buttock.  Patient states she did take 3 ibuprofen around 11 AM has been doing IcyHot patches without improvement of symptoms.  Denies falling and landing on her back.  Denies any bowel or bladder incontinence or saddle paresthesia or difficulty ambulating.  Denies any urinary frequency urgency or burning.  No history of pyelonephritis or nephrolithiasis.  Denies any chance of pregnancy has IUD    Objective:     Past Medical History:    Allergic rhinitis    Compound nevus    back and axilla    Dermatofibroma    right leg    Dysplastic nevus    Left lower abdomen    Recurrent UTI (urinary tract infection)              Past Surgical History:   Procedure Laterality Date      2002    13 hr labor  36 week 5 lb(s) 3 oz Male \"Merrick\"  JLK  PIH, A1GDM      2010    CAP ([place])      2012      APGAR:9 (1 min) 9 (5 min)                Social History     Socioeconomic History    Marital status:    Tobacco Use    Smoking status: Former     Current packs/day: 0.00     Average packs/day: 0.3 packs/day for 8.0 years (2.0 ttl pk-yrs)     Types: Cigarettes     Start date: 6/15/1998     Quit date: 6/15/2006     Years since quittin.4     Passive exposure: Never     Smokeless tobacco: Never    Tobacco comments:     quit 2006   Vaping Use    Vaping status: Never Used   Substance and Sexual Activity    Alcohol use: Yes     Comment: OCC    Drug use: No    Sexual activity: Yes     Partners: Male     Birth control/protection: Mirena, I.U.D.   Other Topics Concern    Caffeine Concern Yes     Comment: coffee 1 cup daily    Grew up on a farm No    History of tanning Yes    Outdoor occupation No    Breast feeding No    Reaction to local anesthetic No    Pt has a pacemaker No    Pt has a defibrillator No              Review of Systems    Positive for stated complaint: lower back pain  Other systems are as noted in HPI.  Constitutional and vital signs reviewed.      All other systems reviewed and negative except as noted above.    Physical Exam     ED Triage Vitals [11/16/24 1345]   /78   Pulse 87   Resp 16   Temp 97.9 °F (36.6 °C)   Temp src Temporal   SpO2 100 %   O2 Device None (Room air)       Current Vitals:   Vital Signs  BP: 118/78  Pulse: 87  Resp: 16  Temp: 97.9 °F (36.6 °C)  Temp src: Temporal    Oxygen Therapy  SpO2: 100 %  O2 Device: None (Room air)        Physical Exam  Vitals and nursing note reviewed.   Constitutional:       Appearance: Normal appearance.   HENT:      Right Ear: External ear normal.      Left Ear: External ear normal.      Nose: Nose normal.      Mouth/Throat:      Mouth: Mucous membranes are moist.      Pharynx: Oropharynx is clear.   Eyes:      Conjunctiva/sclera: Conjunctivae normal.   Abdominal:      Palpations: Abdomen is soft.      Tenderness: There is no abdominal tenderness. There is no right CVA tenderness or left CVA tenderness.   Musculoskeletal:         General: Normal range of motion.      Cervical back: Normal range of motion.      Lumbar back: Spasms and tenderness present. Negative right straight leg raise test and negative left straight leg raise test.        Back:       Comments: Cervical thoracic lumbar spine no midline TTP or  step-offs   Skin:     General: Skin is warm and dry.      Capillary Refill: Capillary refill takes less than 2 seconds.   Neurological:      General: No focal deficit present.      Mental Status: She is alert and oriented to person, place, and time.   Psychiatric:         Mood and Affect: Mood normal.             ED Course   Labs Reviewed - No data to display     XR LUMBAR SPINE (MIN 2 VIEWS) (CPT=72100)    Result Date: 11/16/2024  CONCLUSION:   No acute fracture or traumatic listhesis of the lumbar spine.  Preserved disc heights with mild lower lumbar facet arthropathy.  Slight levocurvature of the lumbar spine.  Lesser incidental findings described above.     Dictated by (CST): Tom Green MD on 11/16/2024 at 2:17 PM     Finalized by (CST): Tom Green MD on 11/16/2024 at 2:19 PM              [facility]         Medical Decision Making  44-year-old female nontoxic-appearing ambulatory with a steady gait with low back pain.  DDx back spasms versus lumbar strain versus acute back pain with sciatica.  No clinical indication for labs or advanced imaging but lumbar spine x-ray will be ordered and a lidocaine patch anticipate home with diazepam for muscle pain and prednisone as there is questionable sciatica.  Discussed this plan of care with the patient and the medications that were prescribed and the side effects discussed continuing over-the-counter ibuprofen and Tylenol for pain.  Discussed placing an order at the spine center should be calling her to set up a follow-up appointment.  Discussed that she may follow-up with her PCP may call and set up an appointment for physical therapy.  Discussed strict ER precautions with any new or worsening symptoms.  Patient agreeable with the plan of care and acknowledges understanding discharge instructions.      Lumbar spine x-ray independently viewed by this provider no fracture noted.  Discussed x-ray results with the patient discussed read by radiologist and incidental  findings concerning for some arthritis in the spine and in the left hip.  Reinforced the plan of care supportive therapy outpatient follow-up ER precautions and medication that were prescribed.  Patient acknowledged understanding discharge instructions.      Problems Addressed:  Acute bilateral low back pain, unspecified whether sciatica present: acute illness or injury    Amount and/or Complexity of Data Reviewed  Radiology: ordered and independent interpretation performed. Decision-making details documented in ED Course.    Risk  OTC drugs.  Prescription drug management.        Disposition and Plan     Clinical Impression:  1. Acute bilateral low back pain, unspecified whether sciatica present         Disposition:  Discharge  11/16/2024  2:28 pm    Follow-up:  Alexander Mckeon, DO  [address] [place] [address]  [phone]      As needed          Medications Prescribed:  Current Discharge Medication List        START taking these medications    Details   predniSONE 20 MG Oral Tab Take 2 tablets (40 mg total) by mouth daily for 5 days.  Qty: 10 tablet, Refills: 0    Associated Diagnoses: Acute bilateral low back pain, unspecified whether sciatica present      diazePAM 5 MG Oral Tab Take 1 tablet (5 mg total) by mouth 2 (two) times daily as needed (muscle spasm, may cause drowsiness no driving or operating machinery while taking this medication).  Qty: 10 tablet, Refills: 0    Associated Diagnoses: Acute bilateral low back pain, unspecified whether sciatica present                 Supplementary Documentation:

## 2024-11-16 NOTE — ED INITIAL ASSESSMENT (HPI)
Patient with left lower back pain on and off.  Saw her pcp last Friday and an adjustment was done.  States pain has gotten progressively worse, was cleaning the floor and got up and now with difficulty bending over.  Taking ibuprofen and icy hot patches without improvement in symptoms.  Denies trauma/injury.

## 2024-11-16 NOTE — DISCHARGE INSTRUCTIONS
You should receive a phone call from the spine center to set up a follow-up appointment you may also make an appointment with physical therapy.  Continue over-the-counter ibuprofen and Tylenol for pain you may continue over-the-counter lidocaine patch start the prednisone as prescribed and take the Valium as needed for muscular back pain this medication will cause drowsiness no driving or operating machinery or take this medication.  Prednisone side effect is that it can keep you awake so it is best that you take it early in the morning with food on the stomach.  If you develop any new or worsening symptoms wake up cannot feel your lower extremities or ambulate unintentionally urinate or have a bowel movement on yourself develop any urinary symptoms frequency urgency burning any fever nausea or vomiting go to the nearest emergency department.

## 2024-11-19 ENCOUNTER — OFFICE VISIT (OUTPATIENT)
Dept: PAIN CLINIC | Facility: HOSPITAL | Age: 44
End: 2024-11-19
Attending: NURSE PRACTITIONER
Payer: COMMERCIAL

## 2024-11-19 VITALS
WEIGHT: 135 LBS | BODY MASS INDEX: 24 KG/M2 | DIASTOLIC BLOOD PRESSURE: 72 MMHG | SYSTOLIC BLOOD PRESSURE: 112 MMHG | OXYGEN SATURATION: 98 % | HEART RATE: 62 BPM

## 2024-11-19 DIAGNOSIS — M54.50 LUMBAR BACK PAIN: ICD-10-CM

## 2024-11-19 DIAGNOSIS — M54.16 LUMBAR RADICULITIS: Primary | ICD-10-CM

## 2024-11-19 DIAGNOSIS — M79.10 MYALGIA: ICD-10-CM

## 2024-11-19 NOTE — PATIENT INSTRUCTIONS
Refill policies:    Allow 2-3 business days for refills; controlled substances may take longer.  Contact your pharmacy at least 5 days prior to running out of medication and have them send an electronic request or submit request through the “request refill” option in your [employer] account.  Refills are not addressed on weekends; covering physicians do not authorize routine medications on weekends.  No narcotics or controlled substances are refilled after noon on Fridays or by on call physicians.  By law, narcotics must be electronically prescribed.  A 30 day supply with no refills is the maximum allowed.  If your prescription is due for a refill, you may be due for a follow up appointment.  To best provide you care, patients receiving routine medications need to be seen at least once a year.  Patients receiving narcotic/controlled substance medications need to be seen at least once every 3 months.  In the event that your preferred pharmacy does not have the requested medication in stock (e.g. Backordered), it is your responsibility to find another pharmacy that has the requested medication available.  We will gladly send a new prescription to that pharmacy at your request.    Scheduling Tests:    If your physician has ordered radiology tests such as MRI or CT scans, please contact Central Scheduling at [phone] right away to schedule the test.  Once scheduled, the [facility] Centralized Referral Team will work with your insurance carrier to obtain pre-certification or prior authorization.  Depending on your insurance carrier, approval may take 3-10 days.  It is highly recommended patients assure they have received an authorization before having a test performed.  If test is done without insurance authorization, patient may be responsible for the entire amount billed.      Precertification and Prior Authorizations:  If your physician has recommended that you have a procedure or additional testing performed the [facility]  Centralized Referral Team will contact your insurance carrier to obtain pre-certification or prior authorization.    You are strongly encouraged to contact your insurance carrier to verify that your procedure/test has been approved and is a COVERED benefit.  Although the [facility] Centralized Referral Team does its due diligence, the insurance carrier gives the disclaimer that \"Although the procedure is authorized, this does not guarantee payment.\"    Ultimately the patient is responsible for payment.   Thank you for your understanding in this matter.  Paperwork Completion:  If you require FMLA or disability paperwork for your recovery, please make sure to either drop it off or have it faxed to our office at [phone]. Be sure the form has your name and date of birth on it.  The form will be faxed to our Forms Department and they will complete it for you.  There is a 25$ fee for all forms that need to be filled out.  Please be aware there is a 10-14 day turnaround time.  You will need to sign a release of information (JANIA) form if your paperwork does not come with one.  You may call the Forms Department with any questions at [phone].  Their fax number is [phone].

## 2024-11-19 NOTE — CHRONIC PAIN
Interventional Pain medicine    CC: LBP w occasional LLE radiation     HPI: Patient is a 44 year old female who presents for initial evaluation and treatement of back pain that radiates to the left gluteal and occasionally anterior thigh.  Patient does endorse that she has been having occasional lumbar back pain for over a year.  She did go and see her primary care provider on Friday, who attempted to adjust her back, and since then she said it has been getting a little worse.  She did get a referral for physical therapy, which she is planning on starting.    However on Saturday she started cleaning and when she bent forward she began to have severe lumbar back pain.  She then went to immediate care x-ray lumbar spine was done.  And she was started on prednisone and Valium for her pain.    The pain described as sharp, is rated as a 5 /10 on a NRS. At its best the pain is a 3 /10 and at its worst the pain is a 8 /10.  The pain is episodic, pain worse with bending forward/increased movement, better with  lying down/rest/walking/medication/stretching.  Pain is occasionally associated with tingling/numbness/weakness/cramping, to the left lower extremity. Pain symptoms have affected patient's sleep and physical activity    Functional Limitation: Unable to bend forward and  things, unable to complete house ADLs    Previous treatment includes:   Physical therapy: yes  Injections none  Surgery none  Medications: prednisone, Valium, and ibuprofen  The patient specifically denies weight loss, changes in bathroom habits, fever, or chills.    Current Outpatient Medications   Medication Sig Dispense Refill    predniSONE 20 MG Oral Tab Take 2 tablets (40 mg total) by mouth daily for 5 days. 10 tablet 0    diazePAM 5 MG Oral Tab Take 1 tablet (5 mg total) by mouth 2 (two) times daily as needed (muscle spasm, may cause drowsiness no driving or operating machinery while taking this medication). 10 tablet 0    fluticasone  propionate 50 MCG/ACT Nasal Suspension       Cetirizine HCl 5 MG Oral Tab Take 1 tablet (5 mg total) by mouth daily.         Allergies[1]    Past Medical History:    Allergic rhinitis    Compound nevus    back and axilla    Dermatofibroma    right leg    Dysplastic nevus    Left lower abdomen    Recurrent UTI (urinary tract infection)       Patient Active Problem List   Diagnosis    IUD (intrauterine device) in place    Benign neoplasm of skin of trunk    Benign neoplasm of scalp and skin of neck    Family history of breast cancer       Past Surgical History:   Procedure Laterality Date          13 hr labor  36 week 5 lb(s) 3 oz Male \"[place]rick\"  JLK  PIH, A1GDM      2010    CAP ([place])      2012      APGAR:9 (1 min) 9 (5 min)       Social History     Socioeconomic History    Marital status:      Spouse name: Not on file    Number of children: Not on file    Years of education: Not on file    Highest education level: Not on file   Occupational History    Not on file   Tobacco Use    Smoking status: Former     Current packs/day: 0.00     Average packs/day: 0.3 packs/day for 8.0 years (2.0 ttl pk-yrs)     Types: Cigarettes     Start date: 6/15/1998     Quit date: 6/15/2006     Years since quittin.4     Passive exposure: Never    Smokeless tobacco: Never    Tobacco comments:     quit 2006   Vaping Use    Vaping status: Never Used   Substance and Sexual Activity    Alcohol use: Yes     Comment: OCC    Drug use: No    Sexual activity: Yes     Partners: Male     Birth control/protection: Mirena, I.U.D.   Other Topics Concern     Service Not Asked    Blood Transfusions Not Asked    Caffeine Concern Yes     Comment: coffee 1 cup daily    Occupational Exposure Not Asked    Hobby Hazards Not Asked    Sleep Concern Not Asked    Stress Concern Not Asked    Weight Concern Not Asked    Special Diet Not Asked    Back Care Not Asked    Exercise Not Asked    Bike Helmet Not Asked     Seat Belt Not Asked    Self-Exams Not Asked    Grew up on a farm No    History of tanning Yes    Outdoor occupation No    Breast feeding No    Reaction to local anesthetic No    Pt has a pacemaker No    Pt has a defibrillator No   Social History Narrative    Not on file     Social Drivers of Health     Financial Resource Strain: Not on file   Food Insecurity: Not on file   Transportation Needs: Not on file   Physical Activity: Not on file   Stress: Not on file   Social Connections: Not on file   Housing Stability: Not on file       ROS:  Constitutional: denies weight loss, night sweats, fatigue  Eyes: denies visual changes, headache, eye pain, double vision  Ears, nose, mouth, and throat: denies runny nose, frequent nose bleeds, stuffy ears, ear pain, gingival bleeding, sore throat, gingival bleeding  Cardiovascular: denies chest pain, shortness of breath, orthopnea, palpitations, loss of consciousness  Respiratory: denies cough, sputum, wheezing, shortness of breath  Gastrointestinal: denies abdominal pain, bloating, cramping, nausea/vomitting, constipation  Musculoskeletal: denies joint swelling, crepitus, arthritis. + pain  Integumentary: denies pruritis, rashes, lesions, excessive dryness and/or discoloration  Neurological: denies headache, weakness, numbness, pins and needles  Psychiatric: denies depression, changes in sleep patterns, anxiety, difficulty concentrating  Endocrine: denies tremor, sweaty, slow, tired, polydipsia, polyuria  Hematologic/Lymphatic: denies gum bleeding, prolonged/excessive bleeding, petechiae  Allergic/Immunologic: denies difficulty breathing, swelling at the neck/groin       RADIOLOGY REPORTS:   PROCEDURE: XR LUMBAR SPINE (MIN 2 VIEWS) (CPT=72100)     COMPARISON: [facility], CT ABDOMEN + PELVIS (CONTRAST ONLY) (CPT=74177), 4/13/2023, 5:17 PM.     INDICATIONS: Lower back pain radiating distal to left leg x1 week worsening x2 days. No known injury.     TECHNIQUE: Lumbar  spine radiographs (2-3 views)       FINDINGS:     ALIGNMENT: The lumbar lordosis is maintained.  No significant listhesis.  Slight levocurvature of the lumbar spine.  VERTEBRAL BODIES:   The vertebral body heights are maintained.  No acute fracture.  No aggressive osseous lesion.  DISC SPACES: The disc heights are relatively preserved.  There are multiple small ventral disc osteophyte complexes involving the lower thoracic and lumbar spine.  Mild lower lumbar predominant facet arthropathy.  SACROILIAC JOINTS: No significant sacroiliac degenerative change.  Minimal left hip osteoarthrosis  OTHER: There is an intrauterine device in the pelvis.  There are phleboliths in the pelvis.       Impression   CONCLUSION:     No acute fracture or traumatic listhesis of the lumbar spine.     Preserved disc heights with mild lower lumbar facet arthropathy.     Slight levocurvature of the lumbar spine.     Lesser incidental findings described above.       Dictated by (CST): Tom Green MD on 11/16/2024 at 2:17 PM      Finalized by (CST): Tom Green MD on 11/16/2024 at 2:19 PM        PHYSICAL EXAM:  LMP  (LMP Unknown)     The patient is in no distress. The patient is alert and oriented times three.  Mood and affect are normal.  Examination of the patient's skin and nails is grossly normal.  HEENT: Head is normocephalic, nontraumatic, no pinpoint pupils  CV: regular rate, no pedal edema  Resp: no audible wheezing, normal respiratory effort  Abdomen: Soft, nondistended    Gait: nonantalgic, assistance with ambulation: none  Lumbar: Normal lordosis    Negative point tenderness above the right L1-5 facet joints.  Positive point tenderness above the left L3-5 facet joints.  Negative right SI tenderness.  Negative left SI tenderness.  Negative bilateral greater trochanteric bursa tenderness.  Negative straight leg raise testing on right at 15 degrees as it recreates the radicular symptoms.  Positive straight leg raise testing on  left at 10 degrees as it recreates the radicular symptoms  2+/4 patellar and achilles reflexes on the right  2+/4 patellar and achilles reflexes on the left  5/5 right lower extremity strength   5/5 left lower extremity strength   Sensation is grossly intact to light touch bilateral lower extremities    ASSESSMENT:  Lumbar radiculopathy  Myalgia    The patient has failed multiple conservative treatments with PO NSAIDS and PT for > 6 weeks and is currently with pain that is preventing her from performing ADL. I recommend proceeding with a lumbar MRI to determine if the patient is a candidate for epidural steroid injection to decrease inflammation and pain to facilitate physical therapy and improve patient's functional status. [place] Prescription Monitoring Program reviewed.    PLAN:  1. Injection Recommended: none at this time, mri ordered   2. Anticoagulation: none  3. Imaging: MRI LS  4. Physical Therapy: cont w PT  5. Medications: none prescribed   6. Follow up: after MRI completed     A total of 46 minutes were spent face-to-face with the patient during this encounter and over half of that time was spent on counseling and coordination of care. We discussed in depth the importance of weight loss and exercise which includes physical therapy. I also educated the patient about lifestyle modifications and proper body lifting mechanics.    HOANG Fuentes  Interventional Pain Management         [1] No Known Allergies

## 2024-11-19 NOTE — PROGRESS NOTES
Patient presents in office today with reported L sided LBP and buttocks pain and weakness     Current pain level reported = 4/10    Last reported dose of Diazepam and Prednisone yesterday       Narcotic Contract renewal new patient     11.19.24-LBP-[facility]     Pain increases with bending, activity and ADL's.  8-9/10 at its worst.  Increased pain x1week

## 2024-12-03 ENCOUNTER — TELEPHONE (OUTPATIENT)
Dept: PHYSICAL THERAPY | Facility: HOSPITAL | Age: 44
End: 2024-12-03

## 2024-12-04 ENCOUNTER — OFFICE VISIT (OUTPATIENT)
Dept: PHYSICAL THERAPY | Age: 44
End: 2024-12-04
Attending: FAMILY MEDICINE
Payer: COMMERCIAL

## 2024-12-04 DIAGNOSIS — M54.50 ACUTE BILATERAL LOW BACK PAIN WITHOUT SCIATICA: Primary | ICD-10-CM

## 2024-12-04 PROCEDURE — 97110 THERAPEUTIC EXERCISES: CPT

## 2024-12-04 PROCEDURE — 97161 PT EVAL LOW COMPLEX 20 MIN: CPT

## 2024-12-04 NOTE — PROGRESS NOTES
PHYSICAL THERAPY EVALUATION:   Referring Physician: Dr. Mckeon  Diagnosis: Acute bilateral low back pain without sciatica (M54.50)      Date of Service: 12/4/2024   Date of Order: 11/8/24    Patient verbally consented to be seen for PT evaluation and treatment  Precautions:  None    PATIENT SUMMARY   Mary Bowden is a 44 year old female who presents to therapy today with complaints of L side low back pain, travelling down on the gluteal area.  Pt states that pain is more localized, medications helped. Constant pain and is aggravated by leaning on one side, sitting and a standing for a long time, prolonged walking increases the pain, Lying on back relieves some of the pain     Pt describes pain level current 3/10, at best 2/10, at worst 8/10.     Current functional limitations include reported pain and difficulty with : prolonged sitting, walking and standing,  sleeping is disturb with certain position, gets worse as the days go by, not able to stay as active, prolonged driving affects,walking down the stairs    History of current condition/ Previous episodes/treatments and results: pt states that her condition started 3 weeks, constant and more intense versus just the occasional LBP, also radiating pain versus LBP before. No formal treatment but was given. Did have issues with LBP but dull ache post exercises       Mary describes prior level of function pt was painfree and able to go to the gym 4x a week: barre classes, strengthening.     Social  History/Occupation: finance/sitting       Pt goals include  to relieve pain and see what causes the pain.    Past medical history was reviewed with Mary.   Significant findings include   Past Medical History:    Allergic rhinitis    Compound nevus    back and axilla    Dermatofibroma    right leg    Dysplastic nevus    Left lower abdomen    Recurrent UTI (urinary tract infection)            ASSESSMENT  Mary presents to physical therapy evaluation with primary c/o  complaints of L side low back pain, travelling down on the gluteal area.  Pt states that pain is more localized, medications helped. Constant pain and is aggravated by leaning on one side, sitting and a standing for a long time, prolonged walking increases the pain, Lying on back relieves some of the pain   . Mary presented with above co-morbidities .    Mary reports functional deficits include but are not limited to prolonged sitting, walking and standing,  sleeping is disturb with certain position, gets worse as the days go by, not able to stay as active, prolonged driving affects,walking down the stairs.      The results of the objective tests and measures as noted below show  impairments of  joint mobility, motor function, muscle performance, muscle endurance, range of motion, and coordination significantly affecting :lumbo pelvic  Pt has decreased lumbar mobility with extension direction  preference noted with evaluation.       Mary  has impaired posture.    Mary has no noted gait deviations and some reported  compensation when managing stairs. Transitional transfers are not affected by pain .   Pt is positive for hypertonicity  noted on paraspinals area  upon palpation .Hamstring flexibility decreased on B LE.     Pt and PT discussed evaluation findings, pathology, POC and HEP.    Pt voiced understanding and performs HEP correctly without reported pain.  Instructions were provided on how to modify as need or when to stop.    Skilled Physical Therapy is medically necessary to address the above impairments and reach functional goals to go back to prior level of function with minimal to no compensation, with improved ability to manage and decrease symptoms and pain when performing daily/work and community tasks.      OBJECTIVE:   Observation/Transfers I  Posture:increased lordosis  Gait:  pt ambulates on level ground with normal mechanics  Stairs:minimal break with descending  Palpation: inc tone on LS  paraspinals        ROM:(*)  painfree in all planes  % loss as noted:   With limited lumbar  flexion WFL%,   Lumbar extension :25%  B lateral flexion :25%  B rotation :NT %         Sustained motions:  Sustained slouch:increased pain on LS   Sustained overcorrect:decreased pain   Pronelying:increased pain   MAYTE:decreased pain   Repeated motions:  SKTC:increased pain   EIL:midrange decreased pain     right left Comments    Motor Control Motor Control    Double Leg Squat WNL WNL    Single Leg Squat Decreased Decreased    Single Leg Balance 30 sec 30 sec      FLEXIBILITY: Supine 90/90 test for hamstring R:mod loss versus L:mod loss     AROM for LE :   BLE major joints of hip/knee and ankle are WFL  painfree       MMT for   RLE hip muscles/knee muscles grossly graded  5/5 except hip extension 4/5  LLE muscles grossly graded 5/5 except 4/5 for hip flex and abduction; 3/5 for hip extension and WFL for knee muscles     Today’s Treatment and Response:   Discussion proper/upright posturing and how to decrease symptoms/centralize symptoms     THERAEX X 10 mins  2 cycles with  AROM check  Pronelying x 2 mins  MAYTE x2 mins  EIL: midrange 2x10  Prone quad stretches          Pt education was provided on exam findings, treatment diagnosis, treatment plan, expectations, and prognosis. Pt was also provided recommendations for activity modifications, possible soreness after evaluation, and postural corrections.  Patient was instructed in and issued a HEP for: extension progression as above    Charges: PT Eval Low Complexity, theraex 1      Total Timed Treatment: 10 min     Total Treatment Time: 40 min     Based on clinical rationale and outcome measures, this evaluation involved Low Complexity decision making due to 1-2 personal factors/comorbidities, 3 body structures involved/activity limitations, and evolving symptoms including changing pain levels.    PLAN OF CARE:    Goals: to be met in 8 visits then will reassess     Pt will be I  with HEP,its progression and management of symptoms and be I with self correction of upright posturing to continue with gains in therapy.   Pt will demonstrate improved AROM in all planes of lumbar motion to WFL, pain and symptoms free to be able return to PLOF   Pt will improve strength on BLE graded below 5/5 to at least half a grade or better for ease of walking/standing and stairs management.   Pt will report overall decreased in pain and symptoms and functional limitations  by 50% or better to be able to PLOF  Pt will demonstrate biomechanical strategies for functional transfers, floor to waist lifting etc to minimize risk of injuries:75% of the time with minimal cues.    Frequency / Duration: Patient will be seen for 1-2 x/week or a total of 8 visits over a 90 day period. Frequency may be changed as appropriate  Treatment plan includes:  Manual Therapy; Therapeutic Exercises; Neuromuscular Re-education; Therapeutic Activity; Gait Training; Patient education; Home exercise program instruction,self care home management,  Modalities as needed:Electrical stimulation (unattended)    Education or treatment limitation: None  Rehab Potential:good     OSWESTRY: Oswestry Disability Index Score  Score: (Patient-Rptd) 30 % (12/4/2024 11:31 AM)       Patient/Family/Caregiver was advised of these findings, precautions, and treatment options and has agreed to actively participate in planning and for this course of care.    Thank you for your referral. Please co-sign or sign and return this letter via fax as soon as possible to [phone]. If you have any questions, please contact me at Dept: [phone]    Sincerely,  Electronically signed by therapist: Analy Munoz,PT,DPT,CAPP-OB  Physician's certification required: Yes  I certify the need for these services furnished under this plan of treatment and while under my care.    X___________________________________________________ Date____________________    Certification  From: 12/4/2024  To:3/4/2025

## 2024-12-05 NOTE — PROGRESS NOTES
Dx:  Acute bilateral low back pain without sciatica (M54.50)            Insurance   [employer]           Authorized  # visits by insurance  :  8    Expiration date  of Authorization:3/4/25    Eval date/latest PN:12/4/24  Initial POC# of visits: 8                POC cert : 3/4/25    Authorizing Physician: Dr. Mckeon    Fall Risk: standard         Precautions: none         Subjective: pt reports being the same  PAIN LEVEL:3/10, still on the L LS area  Assessment:    Hypertonicity noted on initial findings on affected area   worked  to assist with decreased guarding  for better  . Discussed self management with Lacrosse ball etc     PT advanced clinic exercises   to long lever with 2# except hip extension at this time  Improved ROM noted    See exercises below  Objective:   ROM:(*)  painfree in all planes  % loss as noted:               With limited lumbar  flexion WFL%,   Lumbar extension :25%  B lateral flexion :0%  B rotation :NT %        Goals:   goals addressed this day as noted above  Goals: to be met in 8 visits then will reassess      Pt will be I with HEP,its progression and management of symptoms and be I with self correction of upright posturing to continue with gains in therapy.   Pt will demonstrate improved AROM in all planes of lumbar motion to WFL, pain and symptoms free to be able return to PLOF   Pt will improve strength on BLE graded below 5/5 to at least half a grade or better for ease of walking/standing and stairs management.   Pt will report overall decreased in pain and symptoms and functional limitations  by 50% or better to be able to PLOF  Pt will demonstrate biomechanical strategies for functional transfers, floor to waist lifting etc to minimize risk of injuries:75% of the time with minimal cues.     Plan: cont per POC and assessment towards progressive ROM , strengthening , HEP compliance, will assess effects of MFR/STM     Date: 12/6/24  TX#: 2/8 Date:              TX#: 3/ Date:        TX#:  4/ Date:               TX#: 5/   Date:   Tx#: 6/   Theraex:  25 mins  TRA activation  SLR x10 then 2# x10  SL hip abd 2# 2x10  SL clams 2x 10  Prone hip extension 2x10  EIL x 2-3 reps  ROM check  Supine APT/PPT 2x10         Manual: X15 mins     supine   sidelying   Prone  Prone for gentle extension mobs lumbar area    Foam roll/ manual  MFR for paralumbars/QL   PSIS        Gait/NMRed:        Modalities        HEP see patient instructions tab if with new HEP see patient instructions tab if with new HEP see patient instructions tab if with new HEP see patient instructions tab if with new HEP see patient instructions tab if with new HEP          Charges: theraex x2,man mob x1       Total Timed Treatment: 40 min  Total Treatment Time: 40 min

## 2024-12-06 ENCOUNTER — OFFICE VISIT (OUTPATIENT)
Dept: PHYSICAL THERAPY | Age: 44
End: 2024-12-06
Attending: FAMILY MEDICINE
Payer: COMMERCIAL

## 2024-12-06 PROCEDURE — 97140 MANUAL THERAPY 1/> REGIONS: CPT

## 2024-12-06 PROCEDURE — 97110 THERAPEUTIC EXERCISES: CPT

## 2024-12-11 NOTE — PROGRESS NOTES
Dx:  Acute bilateral low back pain without sciatica (M54.50)            Insurance   [employer]           Authorized  # visits by insurance  :  8    Expiration date  of Authorization:3/4/25    Eval date/latest PN:12/4/24  Initial POC# of visits: 8                POC cert : 3/4/25    Authorizing Physician: Dr. Mckeon    Fall Risk: standard         Precautions: none         Subjective: pt reports that she still has pain with prolonged sitting,  6/10 until  she does extension. She also had an instance that sharp pain happened with some movements. Being mindful of symmetry and upright posturing  PAIN LEVEL:2/10, still on the L LS area  Assessment:    Hypertonicity and tenderness on LS area continued to be worked on today included QL and PSIS area  Trial of IFC today  to see if guarding would decrease  Still limited EIL/extension AROM      See exercises below  Objective:   ROM:(*)  painfree in all planes  % loss as noted:               With limited lumbar  flexion WFL%,   Lumbar extension :25%  B lateral flexion :0%  B rotation :NT %        Goals:   goals addressed this day as noted above  Goals: to be met in 8 visits then will reassess      Pt will be I with HEP,its progression and management of symptoms and be I with self correction of upright posturing to continue with gains in therapy.   Pt will demonstrate improved AROM in all planes of lumbar motion to WFL, pain and symptoms free to be able return to PLOF   Pt will improve strength on BLE graded below 5/5 to at least half a grade or better for ease of walking/standing and stairs management.   Pt will report overall decreased in pain and symptoms and functional limitations  by 50% or better to be able to PLOF  Pt will demonstrate biomechanical strategies for functional transfers, floor to waist lifting etc to minimize risk of injuries:75% of the time with minimal cues.     Plan: cont per POC and assessment towards progressive ROM , strengthening , HEP compliance, will  assess effects of MFR/STM     Date: 12/6/24  TX#: 2/8 Date:         12/12/24     TX#: 3/ Date:        TX#: 4/ Date:               TX#: 5/   Date:   Tx#: 6/   Theraex:  25 mins  TRA activation  SLR x10 then 2# x10  SL hip abd 2# 2x10  SL clams 2x 10  Prone hip extension 2x10  EIL x 2-3 reps  ROM check  Supine APT/PPT 2x10   X 10  EIL x10 3: progressing range  Posterior pelvic tilts  Rai x10        Manual: X15 mins     supine   sidelying   Prone  Prone for gentle extension mobs lumbar area    Foam roll/ manual  MFR for paralumbars/QL   PSIS  X 20 mins     supine   sidelying   Prone  Prone for gentle extension mobs lumbar area    Foam roll/ manual  MFR for paralumbars/QL   PSIS      Gait/NMRed:        Modalities  IFC x15 mins 1-150HZ with heat      HEP see patient instructions tab if with new HEP see patient instructions tab if with new HEP see patient instructions tab if with new HEP see patient instructions tab if with new HEP see patient instructions tab if with new HEP          Charges: theraex x1,man mob x1 , IFC      Total Timed Treatment: 30 min  Total Treatment Time: 45 min

## 2024-12-12 ENCOUNTER — OFFICE VISIT (OUTPATIENT)
Dept: PHYSICAL THERAPY | Age: 44
End: 2024-12-12
Attending: FAMILY MEDICINE
Payer: COMMERCIAL

## 2024-12-12 PROCEDURE — 97140 MANUAL THERAPY 1/> REGIONS: CPT

## 2024-12-12 PROCEDURE — 97014 ELECTRIC STIMULATION THERAPY: CPT

## 2024-12-12 PROCEDURE — 97110 THERAPEUTIC EXERCISES: CPT

## 2024-12-13 NOTE — PROGRESS NOTES
Dx:  Acute bilateral low back pain without sciatica (M54.50)            Insurance   [employer]           Authorized  # visits by insurance  :  8    Expiration date  of Authorization:3/4/25    Eval date/latest PN:12/4/24  Initial POC# of visits: 8                POC cert : 3/4/25    Authorizing Physician: Dr. Mckeon    Fall Risk: standard         Precautions: none         Subjective: pt states that she is better and able to sit longer. Pain is still localized , worst at 5/10..  PAIN LEVEL:2/10, still on the L LS area  Assessment:   Mary attended 4 visits for Physical Therapy.      Mary reports being better with IFC and STM but still has pain with prolonged sitting , worst at 5/10  Pt will follow up with an MRI and MD due to residual pain Mary demonstrates better understanding and ability  to self correct upright posturing . Pain management ,  HEP improved proper body mechanics/strategies     Overall improvement is noted   AROM  on lumbar extension  Mary now with decreased  tenderness/hypertonicity on  LS  PT carmine follow MD's advise    Objectives and goals as noted below.  See exercises below  Objective:   ROM:(*)  painfree in all planes  % loss as noted:               With limited lumbar  flexion WFL%,   Lumbar extension :25%  B lateral flexion :0%  B rotation :NT %        Goals:   goals addressed this day as noted above  Goals: to be met in 8 visits then will reassess      Pt will be I with HEP,its progression and management of symptoms and be I with self correction of upright posturing to continue with gains in therapy.:reports compliance   Pt will demonstrate improved AROM in all planes of lumbar motion to WFL, pain and symptoms free to be able return to PLOF: improved extension   Pt will improve strength on BLE graded below 5/5 to at least half a grade or better for ease of walking/standing and stairs management.: on going   Pt will report overall decreased in pain and symptoms and functional limitations  by  50% or better to be able to PLOF: minimal improvement  Pt will demonstrate biomechanical strategies for functional transfers, floor to waist lifting etc to minimize risk of injuries:75% of the time with minimal cues.:in progress     Plan: will follow MD's advise   Date: 12/6/24  TX#: 2/8 Date:         12/12/24     TX#: 3/ Date:      12/17/24  TX#: 4/ Date:               TX#: 5/   Date:   Tx#: 6/   Theraex:  25 mins  TRA activation  SLR x10 then 2# x10  SL hip abd 2# 2x10  SL clams 2x 10  Prone hip extension 2x10  EIL x 2-3 reps  ROM check  Supine APT/PPT 2x10   X 10  EIL x10 3: progressing range  Posterior pelvic tilts  Rai x10   [id number]  EIL x10  x mid range then   EIL as able x10 then 5x with OP  Posterior pelvic tilts     Manual: X15 mins     supine   sidelying   Prone  Prone for gentle extension mobs lumbar area    Foam roll/ manual  MFR for paralumbars/QL   PSIS  X 20 mins     supine   sidelying   Prone  Prone for gentle extension mobs lumbar area    Foam roll/ manual  MFR for paralumbars/QL   PSIS X 20 mins     supine   sidelying   Prone  Prone for gentle extension mobs lumbar area    manual  MFR for paralumbars/QL   PSIS        Gait/NMRed:        Modalities  IFC x15 mins 1-150HZ with heat IFC x15 mins 1-150HZ with heat     HEP see patient instructions tab if with new HEP see patient instructions tab if with new HEP see patient instructions tab if with new HEP see patient instructions tab if with new HEP see patient instructions tab if with new HEP          Charges: theraex x1,man mob x1 , IFC      Total Timed Treatment: 30 min  Total Treatment Time: 45 min

## 2024-12-17 ENCOUNTER — OFFICE VISIT (OUTPATIENT)
Dept: PHYSICAL THERAPY | Age: 44
End: 2024-12-17
Attending: FAMILY MEDICINE
Payer: COMMERCIAL

## 2024-12-17 PROCEDURE — 97014 ELECTRIC STIMULATION THERAPY: CPT

## 2024-12-17 PROCEDURE — 97140 MANUAL THERAPY 1/> REGIONS: CPT

## 2024-12-17 PROCEDURE — 97110 THERAPEUTIC EXERCISES: CPT

## 2024-12-19 ENCOUNTER — HOSPITAL ENCOUNTER (OUTPATIENT)
Dept: MRI IMAGING | Age: 44
Discharge: HOME | End: 2024-12-19
Attending: NURSE PRACTITIONER
Payer: COMMERCIAL

## 2024-12-19 ENCOUNTER — LAB ENCOUNTER (OUTPATIENT)
Dept: LAB | Age: 44
End: 2024-12-19
Attending: NURSE PRACTITIONER
Payer: COMMERCIAL

## 2024-12-19 DIAGNOSIS — M54.50 LUMBAR BACK PAIN: ICD-10-CM

## 2024-12-19 DIAGNOSIS — M54.16 LUMBAR RADICULITIS: ICD-10-CM

## 2024-12-19 DIAGNOSIS — N92.0 MENORRHAGIA WITH REGULAR CYCLE: ICD-10-CM

## 2024-12-19 LAB
ALT SERPL-CCNC: 8 U/L
ANION GAP SERPL CALC-SCNC: 4 MMOL/L (ref 0–18)
AST SERPL-CCNC: 14 U/L (ref ?–34)
BASOPHILS # BLD AUTO: 0.07 X10(3) UL (ref 0–0.2)
BASOPHILS NFR BLD AUTO: 1.4 %
BUN BLD-MCNC: 12 MG/DL (ref 9–23)
BUN/CREAT SERPL: 14.5 (ref 10–20)
CALCIUM BLD-MCNC: 9.8 MG/DL (ref 8.7–10.4)
CHLORIDE SERPL-SCNC: 106 MMOL/L (ref 98–112)
CHOLEST SERPL-MCNC: 203 MG/DL (ref ?–200)
CO2 SERPL-SCNC: 30 MMOL/L (ref 21–32)
CREAT BLD-MCNC: 0.83 MG/DL
DEPRECATED RDW RBC AUTO: 39.5 FL (ref 35.1–46.3)
EGFRCR SERPLBLD CKD-EPI 2021: 89 ML/MIN/1.73M2 (ref 60–?)
EOSINOPHIL # BLD AUTO: 0.24 X10(3) UL (ref 0–0.7)
EOSINOPHIL NFR BLD AUTO: 4.8 %
ERYTHROCYTE [DISTWIDTH] IN BLOOD BY AUTOMATED COUNT: 11.9 % (ref 11–15)
FASTING PATIENT LIPID ANSWER: YES
FASTING STATUS PATIENT QL REPORTED: YES
GLUCOSE BLD-MCNC: 88 MG/DL (ref 70–99)
HCT VFR BLD AUTO: 40.1 %
HDLC SERPL-MCNC: 54 MG/DL (ref 40–59)
HGB BLD-MCNC: 13.2 G/DL
IMM GRANULOCYTES # BLD AUTO: 0.01 X10(3) UL (ref 0–1)
IMM GRANULOCYTES NFR BLD: 0.2 %
LDLC SERPL CALC-MCNC: 140 MG/DL (ref ?–100)
LYMPHOCYTES # BLD AUTO: 1.86 X10(3) UL (ref 1–4)
LYMPHOCYTES NFR BLD AUTO: 37.1 %
MCH RBC QN AUTO: 29.8 PG (ref 26–34)
MCHC RBC AUTO-ENTMCNC: 32.9 G/DL (ref 31–37)
MCV RBC AUTO: 90.5 FL
MONOCYTES # BLD AUTO: 0.51 X10(3) UL (ref 0.1–1)
MONOCYTES NFR BLD AUTO: 10.2 %
NEUTROPHILS # BLD AUTO: 2.32 X10 (3) UL (ref 1.5–7.7)
NEUTROPHILS # BLD AUTO: 2.32 X10(3) UL (ref 1.5–7.7)
NEUTROPHILS NFR BLD AUTO: 46.3 %
NONHDLC SERPL-MCNC: 149 MG/DL (ref ?–130)
OSMOLALITY SERPL CALC.SUM OF ELEC: 289 MOSM/KG (ref 275–295)
PLATELET # BLD AUTO: 264 10(3)UL (ref 150–450)
POTASSIUM SERPL-SCNC: 4.3 MMOL/L (ref 3.5–5.1)
RBC # BLD AUTO: 4.43 X10(6)UL
SODIUM SERPL-SCNC: 140 MMOL/L (ref 136–145)
TRIGL SERPL-MCNC: 51 MG/DL (ref 30–149)
TSI SER-ACNC: 1.25 UIU/ML (ref 0.55–4.78)
VLDLC SERPL CALC-MCNC: 9 MG/DL (ref 0–30)
WBC # BLD AUTO: 5 X10(3) UL (ref 4–11)

## 2024-12-19 PROCEDURE — 84460 ALANINE AMINO (ALT) (SGPT): CPT

## 2024-12-19 PROCEDURE — 80048 BASIC METABOLIC PNL TOTAL CA: CPT

## 2024-12-19 PROCEDURE — 84450 TRANSFERASE (AST) (SGOT): CPT

## 2024-12-19 PROCEDURE — 85025 COMPLETE CBC W/AUTO DIFF WBC: CPT

## 2024-12-19 PROCEDURE — 80061 LIPID PANEL: CPT

## 2024-12-19 PROCEDURE — 36415 COLL VENOUS BLD VENIPUNCTURE: CPT

## 2024-12-19 PROCEDURE — 84443 ASSAY THYROID STIM HORMONE: CPT

## 2024-12-19 PROCEDURE — 72148 MRI LUMBAR SPINE W/O DYE: CPT | Performed by: NURSE PRACTITIONER

## 2024-12-20 ENCOUNTER — APPOINTMENT (OUTPATIENT)
Dept: PHYSICAL THERAPY | Age: 44
End: 2024-12-20
Attending: FAMILY MEDICINE
Payer: COMMERCIAL

## 2025-01-03 ENCOUNTER — OFFICE VISIT (OUTPATIENT)
Dept: PAIN CLINIC | Facility: HOSPITAL | Age: 45
End: 2025-01-03
Attending: NURSE PRACTITIONER
Payer: COMMERCIAL

## 2025-01-03 VITALS
DIASTOLIC BLOOD PRESSURE: 84 MMHG | SYSTOLIC BLOOD PRESSURE: 122 MMHG | BODY MASS INDEX: 24 KG/M2 | WEIGHT: 135 LBS | HEART RATE: 62 BPM | OXYGEN SATURATION: 97 %

## 2025-01-03 DIAGNOSIS — M48.061 LUMBAR FORAMINAL STENOSIS: ICD-10-CM

## 2025-01-03 DIAGNOSIS — M54.16 LUMBAR RADICULOPATHY: Primary | ICD-10-CM

## 2025-01-03 DIAGNOSIS — M47.816 LUMBAR FACET ARTHROPATHY: ICD-10-CM

## 2025-01-03 PROCEDURE — 99213 OFFICE O/P EST LOW 20 MIN: CPT | Performed by: NURSE PRACTITIONER

## 2025-01-03 NOTE — PROGRESS NOTES
Dx:  Acute bilateral low back pain without sciatica (M54.50)            Insurance   [employer]           Authorized  # visits by insurance  :  8    Expiration date  of Authorization:3/4/25    Eval date/latest PN:12/4/24  Initial POC# of visits: 8                POC cert : 3/4/25    Authorizing Physician: Dr. Mckeon    Fall Risk: standard         Precautions: none         Subjective: pt states that she went to have an MRI and was told she has bulge in L5-S1, L1 and L2.Pt states that she was advised to  have lumbar injection. Pt states that overall she is feeling the same pain when when for longer period of time > 45 mins. Pt states that she has not started her routine. ..Pt will continue with PT, overall she feels the same  PAIN LEVEL:3 /10, still on the L LS area  Assessment  PT continued with extension based exercises, discussed progression of force and went through routine to keep posture neutral and avoid flexion at this time. Pt continued with extension mobs and IFC  See exercises below  Objective:   ROM:(*)  painfree in all planes  % loss as noted:               With limited lumbar  flexion WFL%,   Lumbar extension :25%  B lateral flexion :0%  B rotation :NT %        Goals:   goals addressed this day as noted above  Goals: to be met in 8 visits then will reassess      Pt will be I with HEP,its progression and management of symptoms and be I with self correction of upright posturing to continue with gains in therapy.:reports compliance   Pt will demonstrate improved AROM in all planes of lumbar motion to WFL, pain and symptoms free to be able return to PLOF: improved extension   Pt will improve strength on BLE graded below 5/5 to at least half a grade or better for ease of walking/standing and stairs management.: on going   Pt will report overall decreased in pain and symptoms and functional limitations  by 50% or better to be able to PLOF: minimal improvement  Pt will demonstrate biomechanical strategies for  functional transfers, floor to waist lifting etc to minimize risk of injuries:75% of the time with minimal cues.:in progress     Plan: will do HEP at this time as discussed and will assess   Date: 12/6/24  TX#: 2/8 Date:         12/12/24     TX#: 3/ Date:      12/17/24  TX#: 4/ Date:            1/7/25   TX#: 5/   Date:   Tx#: 6/   Theraex:  25 mins  TRA activation  SLR x10 then 2# x10  SL hip abd 2# 2x10  SL clams 2x 10  Prone hip extension 2x10  EIL x 2-3 reps  ROM check  Supine APT/PPT 2x10   X 10  EIL x10 3: progressing range  Posterior pelvic tilts  Rai x10   [id number]  EIL x10  x mid range then   EIL as able x10 then 5x with OP  Posterior pelvic tilts X  30 mins  Mid EIL x10 then  ERP  EIL x10 with OP x5 x after mobs  Prone hip extension 2x10  x2 # knee flexed and knee extended  Chest taps 1x10  Quadruped alternate arm and leg lift x10  Dead bugs x 10 bridges with ball 1x10  Swiss ball squats 2x10        Manual: X15 mins     supine   sidelying   Prone  Prone for gentle extension mobs lumbar area    Foam roll/ manual  MFR for paralumbars/QL   PSIS  X 20 mins     supine   sidelying   Prone  Prone for gentle extension mobs lumbar area    Foam roll/ manual  MFR for paralumbars/QL   PSIS X 20 mins     supine   sidelying   Prone  Prone for gentle extension mobs lumbar area    manual  MFR for paralumbars/QL   PSIS    X 5 prone mobs x5 mins    Gait/NMRed:        Modalities  IFC x15 mins 1-150HZ with heat IFC x15 mins 1-150HZ with heat IFC x15 mins 1-150HZ     HEP see patient instructions tab if with new HEP see patient instructions tab if with new HEP see patient instructions tab if with new HEP see patient instructions tab if with new HEP see patient instructions tab if with new HEP          Charges: theraex x2, , IFC      Total Timed Treatment: 35 min  Total Treatment Time: 50 min

## 2025-01-03 NOTE — CHRONIC PAIN
Interventional Pain medicine     CC: MRI Follow up    S: Patient is here for lumbar MRI follow-up.  He has been following up with physical therapy, and states that it has mildly helped. She continues to have occasional LLE pain, with axial pain.     Pt here for mri discussion. She reports she does crossfit.     Functional Limitation: Unable to bend forward and  things, unable to complete house ADLs     Previous treatment includes:   Physical therapy: yes  Injections none  Surgery none  Medications: prednisone, Valium, and ibuprofen    The patient specifically denies weight loss, changes in bathroom habits, fever, or chills.    Initial Visit:  Patient is a 44 year old female who presents for initial evaluation and treatement of back pain that radiates to the left gluteal and occasionally anterior thigh.  Patient does endorse that she has been having occasional lumbar back pain for over a year.  She did go and see her primary care provider on Friday, who attempted to adjust her back, and since then she said it has been getting a little worse.  She did get a referral for physical therapy, which she is planning on starting.     However on Saturday she started cleaning and when she bent forward she began to have severe lumbar back pain.  She then went to immediate care x-ray lumbar spine was done.  And she was started on prednisone and Valium for her pain.     The pain described as sharp, is rated as a 5 /10 on a NRS. At its best the pain is a 3 /10 and at its worst the pain is a 8 /10.  The pain is episodic, pain worse with bending forward/increased movement, better with  lying down/rest/walking/medication/stretching.  Pain is occasionally associated with tingling/numbness/weakness/cramping, to the left lower extremity. Pain symptoms have affected patient's sleep and physical activity      Current Outpatient Medications   Medication Sig Dispense Refill    fluticasone propionate 50 MCG/ACT Nasal Suspension        Cetirizine HCl 5 MG Oral Tab Take 1 tablet (5 mg total) by mouth daily.         Allergies[1]    Past Medical History:    Allergic rhinitis    Compound nevus    back and axilla    Dermatofibroma    right leg    Dysplastic nevus    Left lower abdomen    Recurrent UTI (urinary tract infection)       Patient Active Problem List   Diagnosis    IUD (intrauterine device) in place    Benign neoplasm of skin of trunk    Benign neoplasm of scalp and skin of neck    Family history of breast cancer       Past Surgical History:   Procedure Laterality Date          13 hr labor  36 week 5 lb(s) 3 oz Male \"Merrick\"  JLK  PIH, A1GDM      2010    CAP ([place])      2012      APGAR:9 (1 min) 9 (5 min)       Social History     Socioeconomic History    Marital status:      Spouse name: Not on file    Number of children: Not on file    Years of education: Not on file    Highest education level: Not on file   Occupational History    Not on file   Tobacco Use    Smoking status: Former     Current packs/day: 0.00     Average packs/day: 0.3 packs/day for 8.0 years (2.0 ttl pk-yrs)     Types: Cigarettes     Start date: 6/15/1998     Quit date: 6/15/2006     Years since quittin.5     Passive exposure: Never    Smokeless tobacco: Never    Tobacco comments:     quit 2006   Vaping Use    Vaping status: Never Used   Substance and Sexual Activity    Alcohol use: Yes     Comment: OCC    Drug use: No    Sexual activity: Yes     Partners: Male     Birth control/protection: Mirena, I.U.D.   Other Topics Concern     Service Not Asked    Blood Transfusions Not Asked    Caffeine Concern Yes     Comment: coffee 1 cup daily    Occupational Exposure Not Asked    Hobby Hazards Not Asked    Sleep Concern Not Asked    Stress Concern Not Asked    Weight Concern Not Asked    Special Diet Not Asked    Back Care Not Asked    Exercise Yes    Bike Helmet Not Asked    Seat Belt Not Asked    Self-Exams Not Asked    Grew up  on a farm No    History of tanning Yes    Outdoor occupation No    Breast feeding No    Reaction to local anesthetic No    Pt has a pacemaker No    Pt has a defibrillator No   Social History Narrative    Not on file     Social Drivers of Health     Financial Resource Strain: Not on file   Food Insecurity: Not on file   Transportation Needs: Not on file   Physical Activity: Not on file   Stress: Not on file   Social Connections: Not on file   Housing Stability: Not on file       ROS:  Constitutional: denies weight loss, night sweats, fatigue  Eyes: denies visual changes, headache, eye pain, double vision  Ears, nose, mouth, and throat: denies runny nose, frequent nose bleeds, stuffy ears, ear pain, gingival bleeding, sore throat, gingival bleeding  Cardiovascular: denies chest pain, shortness of breath, orthopnea, palpitations, loss of consciousness  Respiratory: denies cough, sputum, wheezing, shortness of breath  Gastrointestinal: denies abdominal pain, bloating, cramping, nausea/vomitting, constipation  Musculoskeletal: denies joint swelling, crepitus, arthritis. + pain  Integumentary: denies pruritis, rashes, lesions, excessive dryness and/or discoloration  Neurological: denies headache, weakness, numbness, pins and needles  Psychiatric: denies depression, changes in sleep patterns, anxiety, difficulty concentrating  Endocrine: denies tremor, sweaty, slow, tired, polydipsia, polyuria  Hematologic/Lymphatic: denies gum bleeding, prolonged/excessive bleeding, petechiae  Allergic/Immunologic: denies difficulty breathing, swelling at the neck/groin       RADIOLOGY REPORTS:   PROCEDURE: MRI SPINE LUMBAR (CPT=72148)     COMPARISON: [facility], CT ABDOMEN + PELVIS (CONTRAST ONLY) (CPT=74177), 4/13/2023, 5:17 PM.  [facility], XR LUMBAR SPINE (MIN 2 VIEWS) (CPT=72100), 11/16/2024, 1:51 PM.     INDICATIONS: M54.50 Lumbar back pain M54.16 Lumbar radiculitis     TECHNIQUE: A  variety of imaging planes and parameters were utilized for visualization of suspected pathology.     FINDINGS:     ALIGNMENT: The lumbar lordosis is intact.  No significant listhesis.  The lowest fully formed disc space is designated L5-S1.  PARASPINAL AREA: No mass.  No hematoma.  BONES: The vertebral body heights are maintained.  No acute fracture.  No focal suspicious marrow replacing lesion.  There are scattered areas of patchy T1/T2 hyperintensity involving the lumbar spine and sacrum, which likely reflect focal fat.  There is   a 7 mm L5 vertebral body hemangioma.  There is a 1.1 cm T2/stir hyperintense lesion involving the L2 vertebral body (5:8), which is indeterminate but favored to reflect an atypical hemangioma.  CORD/CAUDA EQUINA: The conus terminates at L1-L2.  The inferior aspect of the spinal cord is normal in caliber and signal intensity.  No clumping or nodularity of the cauda equina nerve roots.  No intrathecal collection.    OTHER: Negative.     LUMBAR DISC LEVELS:     The disc heights are preserved.  There is disc desiccation with loss of normal T2 disc signal at L1-L2, L4-L5 and L5-S1.  There are few trace ventral disc osteophyte complexes.     L1-L2: Trace disc bulge.  No canal stenosis.  No foraminal narrowing.  L2-L3: No canal stenosis or foraminal narrowing.  L3-L4: No canal stenosis or foraminal narrowing.  L4-L5: Small eccentric left disc bulge with superimposed left foraminal disc protrusion (3:4, 8:21).  Mild facet arthropathy.  No canal stenosis.  Mild left foraminal narrowing.  No right foraminal narrowing.  L5-S1: Small eccentric left disc bulge with superimposed left foraminal disc protrusion and annular fissure (3:5).  Mild-to-moderate facet arthropathy.  No canal stenosis.  Mild to moderate left foraminal narrowing (4:5).  No right foraminal narrowing.         Impression   CONCLUSION:     1. At L5-S1, there is a small disc bulge with superimposed left foraminal disc protrusion and  annular fissure as well as mild to moderate facet arthropathy that results in mild-to-moderate left foraminal narrowing.  2. At L4-L5, there is a small disc bulge with superimposed left foraminal disc protrusion as well as mild facet arthropathy that results in mild foraminal narrowing.  3. No canal stenosis or right foraminal narrowing.  4. No acute fracture or traumatic listhesis of the lumbar spine.  5. Unremarkable appearance of the intrathecal structures.  6. Lesser incidental findings described above.        Dictated by (CST): Tom Green MD on 12/19/2024 at 3:36 PM      Finalized by (CST): Tom Green MD on 12/19/2024 at 3:46 PM         PHYSICAL EXAM:  /84 (BP Location: Right arm, Patient Position: Sitting, Cuff Size: adult)   Pulse 62   Wt 135 lb (61.2 kg)   LMP  (LMP Unknown)   SpO2 97%   BMI 23.91 kg/m²     The patient is in no distress. The patient is alert and oriented times three.  Mood and affect are normal.  Examination of the patient's skin and nails is grossly normal.  HEENT: Head is normocephalic, nontraumatic, no pinpoint pupils  CV: regular rate, no pedal edema  Resp: no audible wheezing, normal respiratory effort  Abdomen: Soft, nondistended    Gait: nonantalgic, assistance with ambulation: none  Lumbar: Normal lordosis     Negative point tenderness above the right L1-5 facet joints.  Positive point tenderness above the left L3-5 facet joints.  Negative right SI tenderness.  Negative left SI tenderness.  Negative bilateral greater trochanteric bursa tenderness.  Negative straight leg raise testing on right at 15 degrees as it recreates the radicular symptoms.  Positive straight leg raise testing on left at 10 degrees as it recreates the radicular symptoms  2+/4 patellar and achilles reflexes on the right  2+/4 patellar and achilles reflexes on the left  5/5 right lower extremity strength   5/5 left lower extremity strength   Sensation is grossly intact to light touch bilateral  lower extremities    ASSESSMENT:  Lumbar radiculopathy, Left L4-5 dermatome   Lumbar stenosis   Lumbar facet arthropathy   Myalgia     The patient has failed multiple conservative treatments with PO NSAIDS and PT for > 6 weeks and is currently with pain that is preventing her from performing ADL. I recommend proceeding with a lumbar epidural steroid injection to decrease inflammation and pain to facilitate physical therapy and improve patient's functional status. [place] Prescription Monitoring Program reviewed.    PLAN:  1. Injection Recommended: LESI L4-5, local   2. Anticoagulation: none  3. Imaging: reviewed Mri w md  4. Physical Therapy: cont wth PT and HEP  5. Medications: none rx  6. Follow up: for above procedure, lesi information provided     A total of 36 minutes were spent face-to-face with the patient during this encounter and over half of that time was spent on counseling and coordination of care. We discussed in depth the importance of weight loss and exercise which includes physical therapy. I also educated the patient about lifestyle modifications and proper body lifting mechanics.    HOANG Fuentes  Interventional Pain Management         [1] No Known Allergies

## 2025-01-07 ENCOUNTER — OFFICE VISIT (OUTPATIENT)
Dept: PHYSICAL THERAPY | Age: 45
End: 2025-01-07
Attending: FAMILY MEDICINE
Payer: COMMERCIAL

## 2025-01-07 PROCEDURE — 97014 ELECTRIC STIMULATION THERAPY: CPT

## 2025-01-07 PROCEDURE — 97110 THERAPEUTIC EXERCISES: CPT

## 2025-01-15 ENCOUNTER — TELEPHONE (OUTPATIENT)
Dept: PAIN CLINIC | Facility: HOSPITAL | Age: 45
End: 2025-01-15

## 2025-01-15 ENCOUNTER — NURSE TRIAGE (OUTPATIENT)
Dept: FAMILY MEDICINE CLINIC | Facility: CLINIC | Age: 45
End: 2025-01-15

## 2025-01-15 ENCOUNTER — OFFICE VISIT (OUTPATIENT)
Dept: FAMILY MEDICINE CLINIC | Facility: CLINIC | Age: 45
End: 2025-01-15

## 2025-01-15 VITALS
WEIGHT: 135.19 LBS | HEIGHT: 64 IN | TEMPERATURE: 101 F | SYSTOLIC BLOOD PRESSURE: 117 MMHG | DIASTOLIC BLOOD PRESSURE: 80 MMHG | BODY MASS INDEX: 23.08 KG/M2 | HEART RATE: 90 BPM

## 2025-01-15 DIAGNOSIS — B34.9 VIRAL SYNDROME: Primary | ICD-10-CM

## 2025-01-15 DIAGNOSIS — M54.16 LUMBAR RADICULOPATHY: Primary | ICD-10-CM

## 2025-01-15 PROCEDURE — 99213 OFFICE O/P EST LOW 20 MIN: CPT | Performed by: FAMILY MEDICINE

## 2025-01-15 RX ORDER — OSELTAMIVIR PHOSPHATE 75 MG/1
75 CAPSULE ORAL 2 TIMES DAILY
Qty: 10 CAPSULE | Refills: 0 | Status: SHIPPED | OUTPATIENT
Start: 2025-01-15

## 2025-01-15 RX ORDER — BENZONATATE 200 MG/1
200 CAPSULE ORAL 3 TIMES DAILY PRN
Qty: 30 CAPSULE | Refills: 0 | Status: SHIPPED | OUTPATIENT
Start: 2025-01-15

## 2025-01-15 RX ORDER — CODEINE PHOSPHATE AND GUAIFENESIN 10; 100 MG/5ML; MG/5ML
10 SOLUTION ORAL NIGHTLY PRN
Qty: 120 ML | Refills: 0 | Status: SHIPPED | OUTPATIENT
Start: 2025-01-15

## 2025-01-15 NOTE — PROGRESS NOTES
Mary Bowden is a 44 year old female.   Chief Complaint   Patient presents with    Cough     2-3 days    Body ache and/or chills     2-3 days    Nasal Congestion     2-3 days     HPI:   2 days - body aches, chills, head pressure. Sore throat. Cough. Nauseated.   Medications Ordered Prior to Encounter[1]   Past Medical History:    Allergic rhinitis    Compound nevus    back and axilla    Dermatofibroma    right leg    Dysplastic nevus    Left lower abdomen    Recurrent UTI (urinary tract infection)      Social History:  Social History     Socioeconomic History    Marital status:    Tobacco Use    Smoking status: Former     Current packs/day: 0.00     Average packs/day: 0.3 packs/day for 8.0 years (2.0 ttl pk-yrs)     Types: Cigarettes     Start date: 6/15/1998     Quit date: 6/15/2006     Years since quittin.6     Passive exposure: Never    Smokeless tobacco: Never    Tobacco comments:     quit 2006   Vaping Use    Vaping status: Never Used   Substance and Sexual Activity    Alcohol use: Yes     Comment: OCC    Drug use: No    Sexual activity: Yes     Partners: Male     Birth control/protection: Mirena, I.U.D.   Other Topics Concern    Caffeine Concern Yes     Comment: coffee 1 cup daily    Exercise Yes    Grew up on a farm No    History of tanning Yes    Outdoor occupation No    Breast feeding No    Reaction to local anesthetic No    Pt has a pacemaker No    Pt has a defibrillator No        REVIEW OF SYSTEMS:   Review of Systems   See HPI     EXAM:   /80   Pulse 90   Temp (!) 101 °F (38.3 °C)   Ht 5' 4\" (1.626 m)   Wt 135 lb 3.2 oz (61.3 kg)   LMP  (LMP Unknown)   BMI 23.21 kg/m²   GENERAL: well developed, well nourished,ill appearing   HEENT: atraumatic, normocephalic,ears and throat are clear  NECK: positive adenopathy   LUNGS: clear to auscultation  CARDIO: RRR without murmur  EXTREMITIES: no cyanosis, clubbing or edema      ASSESSMENT AND PLAN:   1. Viral syndrome  Tx for presumed  influenza. Tamiflu, tessalon perles and cheratussin in evenings.   - guaiFENesin-codeine 100-10 MG/5ML Oral Solution; Take 10 mL by mouth nightly as needed for cough.  Dispense: 120 mL; Refill: 0      The patient indicates understanding of these issues and agrees to the plan.      Sugey Guerra MD  1/15/2025  2:21 PM         [1]   Current Outpatient Medications on File Prior to Visit   Medication Sig Dispense Refill    fluticasone propionate 50 MCG/ACT Nasal Suspension       Cetirizine HCl 5 MG Oral Tab Take 1 tablet (5 mg total) by mouth daily. (Patient not taking: Reported on 1/15/2025)       Current Facility-Administered Medications on File Prior to Visit   Medication Dose Route Frequency Provider Last Rate Last Admin    Levonorgestrel IUD 20 mcg  1 Device Intrauterine Once Teresa Downing MD

## 2025-01-15 NOTE — TELEPHONE ENCOUNTER
Action Requested: Summary for Provider     []  Critical Lab, Recommendations Needed  [] Need Additional Advice  []   FYI    []   Need Orders  [] Need Medications Sent to Pharmacy  []  Other     SUMMARY: Appointment scheduled today at 2:30 PM, per protocol.     Reason for call: Cough  Onset: Jan 13, 2025    Spoke to patient, full name and date of birth verified.  Patient reports worsening cough since Monday.   Patient coughing frequently during call.     Patient denies fever, diarrhea, vomiting.   Chest feels \"heavy\" and congested, minimal sputum or nasal drainage.     Patient requests to see any provider at any location today.     Appointment scheduled.    Reason for Disposition   Patient wants to be seen    Protocols used: Cough-A-[place]

## 2025-01-15 NOTE — TELEPHONE ENCOUNTER
Prior authorization request completed for: LIUDMILA L4/5   Authorization #[id number]  Authorization dates: 1/15/25-7/14/25  CPT codes approved: 97054  Number of visits/dates of service approved: 1  Physician: Saba  [facility]      Patient can be scheduled. Routed to Navigator.

## 2025-01-16 LAB
FLUAV + FLUBV RNA SPEC NAA+PROBE: DETECTED
FLUAV + FLUBV RNA SPEC NAA+PROBE: NOT DETECTED
RSV RNA SPEC NAA+PROBE: NOT DETECTED
SARS-COV-2 RNA RESP QL NAA+PROBE: NOT DETECTED

## 2025-01-16 NOTE — PROGRESS NOTES
Positive for influenza as suspected. Take tamiflu as discussed and as needed medications for cough. - Dr. Guerra

## 2025-01-24 NOTE — TELEPHONE ENCOUNTER
Nataliia Sol    1/24/25 10:17 AM  Note     Pt called our facility stating she has started acupuncture and would like to see if that will work before having the injection. Pt cxl her injection for 1/30/25.

## 2025-02-18 ENCOUNTER — HOSPITAL ENCOUNTER (OUTPATIENT)
Dept: GENERAL RADIOLOGY | Age: 45
Discharge: HOME | End: 2025-02-18
Attending: PODIATRIST
Payer: COMMERCIAL

## 2025-02-18 ENCOUNTER — OFFICE VISIT (OUTPATIENT)
Dept: PODIATRY CLINIC | Facility: CLINIC | Age: 45
End: 2025-02-18

## 2025-02-18 DIAGNOSIS — M79.671 RIGHT FOOT PAIN: ICD-10-CM

## 2025-02-18 DIAGNOSIS — M20.11 HALLUX VALGUS, RIGHT: ICD-10-CM

## 2025-02-18 DIAGNOSIS — M79.671 RIGHT FOOT PAIN: Primary | ICD-10-CM

## 2025-02-18 PROCEDURE — 73630 X-RAY EXAM OF FOOT: CPT | Performed by: PODIATRIST

## 2025-02-18 PROCEDURE — 99204 OFFICE O/P NEW MOD 45 MIN: CPT | Performed by: PODIATRIST

## 2025-02-18 NOTE — PROGRESS NOTES
Reason for Visit      Mary Bowden is a 44 year old female presents today complaining of right foot hallux valgus deformity.     History of Present Illness     Patient presents to clinic today complaining of a painful bunion deformity.  Patient states her aunts had significant bunions and had surgery done on him.  Patient states that there mildly sore with certain shoe but does not affect her everyday life.    The following portions of the patient's history were reviewed and updated as appropriate: allergies, current medications, past family history, past medical history, past social history, past surgical history and problem list.    Allergies[1]      Current Outpatient Medications:     benzonatate 200 MG Oral Cap, Take 1 capsule (200 mg total) by mouth 3 (three) times daily as needed for cough., Disp: 30 capsule, Rfl: 0    oseltamivir 75 MG Oral Cap, Take 1 capsule (75 mg total) by mouth 2 (two) times daily., Disp: 10 capsule, Rfl: 0    guaiFENesin-codeine 100-10 MG/5ML Oral Solution, Take 10 mL by mouth nightly as needed for cough., Disp: 120 mL, Rfl: 0    fluticasone propionate 50 MCG/ACT Nasal Suspension, , Disp: , Rfl:     Cetirizine HCl 5 MG Oral Tab, Take 1 tablet (5 mg total) by mouth daily. (Patient not taking: Reported on 2025), Disp: , Rfl:     There are no discontinued medications.    Patient Active Problem List   Diagnosis    IUD (intrauterine device) in place    Benign neoplasm of skin of trunk    Benign neoplasm of scalp and skin of neck    Family history of breast cancer       Past Medical History:    Allergic rhinitis    Compound nevus    back and axilla    Dermatofibroma    right leg    Dysplastic nevus    Left lower abdomen    Recurrent UTI (urinary tract infection)       Past Surgical History:   Procedure Laterality Date          13 hr labor  36 week 5 lb(s) 3 oz Male \"Merrick\"  JLK  PIH, A1GDM      2010    CAP ([facility])      2012      APGAR:9 (1 min) 9 (5 min)        Family History   Problem Relation Age of Onset    Hypertension Mother     Breast Cancer Mother 69    Melanoma Mother             Cancer Mother     Other (Other) Father 46        mva death.    Breast Cancer Sister 47    Diabetes Maternal Grandmother     Arthritis Maternal Grandmother     Breast Cancer Paternal Aunt 55         from complications of breast cancer    Breast Cancer Paternal Aunt 55         from complications of breast cancer       Social History     Occupational History    Not on file   Tobacco Use    Smoking status: Former     Current packs/day: 0.00     Average packs/day: 0.3 packs/day for 8.0 years (2.0 ttl pk-yrs)     Types: Cigarettes     Start date: 6/15/1998     Quit date: 6/15/2006     Years since quittin.6     Passive exposure: Never    Smokeless tobacco: Never    Tobacco comments:     quit 2006   Vaping Use    Vaping status: Never Used   Substance and Sexual Activity    Alcohol use: Yes     Comment: OCC    Drug use: No    Sexual activity: Yes     Partners: Male     Birth control/protection: Mirena, I.U.D.       ROS      Constitutional: negative for chills, fevers and sweats  Gastrointestinal: negative for abdominal pain, diarrhea, nausea and vomiting  Genitourinary:negative for dysuria and hematuria  Musculoskeletal:negative for arthralgias and muscle weakness  Neurological: negative for paresthesia and weakness  All others reviewed and negative.      Physical Exam     LE PHYSICAL EXAM    Constitution: Well-developed and well-nourished. Gait appears normal. No apparent distress. Alert and oriented to person, place, and time.  Integument: There are no varicosities. Skin appears moist, warm, and supple with positive hair growth. There are no color changes. No open lesions. No macerations, No Hyperkeratotic lesions.  Vascular examination: Dorsalis pedis and posterior tibial pulses are strong bilaterally with capillary filling time less than 3 seconds to all digits. There is  no peripheral edema..  Neurological Sensorium: Grossly intact to sharp/dull. Vibratory: Intact.  Musculoskeletal:   5/5 pedal muscle strength b/l       Laterally deviated hallux right. 1st MPJ ROM < 20 degrees dorsiflexion, negative tracking, and prominent medial exostosis to 1st metahead bilateral      Vitals: LMP 01/03/2025 (Approximate)         Assessment and Plan     Encounter Diagnoses   Name Primary?    Right foot pain Yes    Hallux valgus, right    -The etiology and progression of hallux abductovalgus deformity or bunions were described in great detail to the patient.  -Discussed conservative treatment of padding the area to prevent rubbing on shoes as well as OTC and custom made orthotics to support the foot and prevent the progression of the deformity.  -Discussed that no strapping, tapping, or bracing will correct the bunion deformity and that conservative treatment is aimed at relieving symptoms and preventing progression.  -Discussed the only correction of bunion deformity is surgical correction. Discussed the post operative course and well as risks and complications.  4      Patient was instructed to call the office or on-call podiatric physician immediately with any issues or concerns before the next scheduled visit. Patient to follow-up in clinic in as needed      Maria De Jesus Alcala DPM, [email or web address] OLIVO  Diplomat, American Board of Foot and Ankle Surgery  Certified in Foot and Rearfoot/Ankle Reconstruction  Fellow of the American College of Foot and Ankle Surgeons  Fellowship Trained Foot and Ankle Surgeon   [facility]     2/18/2025    11:23 AM       [1] No Known Allergies

## 2025-02-28 RX ORDER — SODIUM CHLORIDE, SODIUM LACTATE, POTASSIUM CHLORIDE, CALCIUM CHLORIDE 600; 310; 30; 20 MG/100ML; MG/100ML; MG/100ML; MG/100ML
INJECTION, SOLUTION INTRAVENOUS CONTINUOUS
Status: CANCELLED | OUTPATIENT
Start: 2025-02-28

## 2025-03-06 ENCOUNTER — HOSPITAL ENCOUNTER (OUTPATIENT)
Facility: HOSPITAL | Age: 45
Setting detail: HOSPITAL OUTPATIENT SURGERY
Discharge: HOME | End: 2025-03-06
Attending: INTERNAL MEDICINE | Admitting: INTERNAL MEDICINE
Payer: COMMERCIAL

## 2025-03-06 ENCOUNTER — ANESTHESIA EVENT (OUTPATIENT)
Dept: ENDOSCOPY | Facility: HOSPITAL | Age: 45
End: 2025-03-06
Payer: COMMERCIAL

## 2025-03-06 ENCOUNTER — ANESTHESIA (OUTPATIENT)
Dept: ENDOSCOPY | Facility: HOSPITAL | Age: 45
End: 2025-03-06
Payer: COMMERCIAL

## 2025-03-06 VITALS
OXYGEN SATURATION: 100 % | SYSTOLIC BLOOD PRESSURE: 107 MMHG | BODY MASS INDEX: 23.05 KG/M2 | WEIGHT: 135 LBS | HEART RATE: 56 BPM | DIASTOLIC BLOOD PRESSURE: 66 MMHG | TEMPERATURE: 98 F | HEIGHT: 64 IN | RESPIRATION RATE: 15 BRPM

## 2025-03-06 DIAGNOSIS — Z12.11 SCREEN FOR COLON CANCER: ICD-10-CM

## 2025-03-06 LAB — B-HCG UR QL: NEGATIVE

## 2025-03-06 PROCEDURE — 0DBH8ZX EXCISION OF CECUM, VIA NATURAL OR ARTIFICIAL OPENING ENDOSCOPIC, DIAGNOSTIC: ICD-10-PCS | Performed by: INTERNAL MEDICINE

## 2025-03-06 PROCEDURE — 45380 COLONOSCOPY AND BIOPSY: CPT | Performed by: INTERNAL MEDICINE

## 2025-03-06 RX ORDER — SODIUM CHLORIDE, SODIUM LACTATE, POTASSIUM CHLORIDE, CALCIUM CHLORIDE 600; 310; 30; 20 MG/100ML; MG/100ML; MG/100ML; MG/100ML
INJECTION, SOLUTION INTRAVENOUS CONTINUOUS
Status: DISCONTINUED | OUTPATIENT
Start: 2025-03-06 | End: 2025-03-06

## 2025-03-06 RX ORDER — NALOXONE HYDROCHLORIDE 0.4 MG/ML
0.08 INJECTION, SOLUTION INTRAMUSCULAR; INTRAVENOUS; SUBCUTANEOUS ONCE AS NEEDED
Status: DISCONTINUED | OUTPATIENT
Start: 2025-03-06 | End: 2025-03-06

## 2025-03-06 RX ORDER — LIDOCAINE HYDROCHLORIDE 10 MG/ML
INJECTION, SOLUTION EPIDURAL; INFILTRATION; INTRACAUDAL; PERINEURAL AS NEEDED
Status: DISCONTINUED | OUTPATIENT
Start: 2025-03-06 | End: 2025-03-06 | Stop reason: SURG

## 2025-03-06 RX ORDER — PHENYLEPHRINE HCL 10 MG/ML
VIAL (ML) INJECTION AS NEEDED
Status: DISCONTINUED | OUTPATIENT
Start: 2025-03-06 | End: 2025-03-06 | Stop reason: SURG

## 2025-03-06 RX ADMIN — SODIUM CHLORIDE, SODIUM LACTATE, POTASSIUM CHLORIDE, CALCIUM CHLORIDE: 600; 310; 30; 20 INJECTION, SOLUTION INTRAVENOUS at 09:26:00

## 2025-03-06 RX ADMIN — LIDOCAINE HYDROCHLORIDE 50 MG: 10 INJECTION, SOLUTION EPIDURAL; INFILTRATION; INTRACAUDAL; PERINEURAL at 09:28:00

## 2025-03-06 RX ADMIN — PHENYLEPHRINE HCL 80 MCG: 10 MG/ML VIAL (ML) INJECTION at 09:48:00

## 2025-03-06 NOTE — DISCHARGE INSTRUCTIONS
Home Care Instructions for Colonoscopy with Sedation    Diet:  - Resume your regular diet as tolerated unless otherwise instructed.  - Start with light meals to minimize bloating.  - Do not drink alcohol today.    Medication:  - If you have questions about resuming your normal medications, please contact your Primary Care Physician.    Activities:  - Take it easy today. Do not return to work today.  - Do not drive today.  - Do not operate any machinery today (including kitchen equipment).    Colonoscopy:  - You may notice some rectal \"spotting\" (a little blood on the toilet tissue) for a day or two after the exam. This is normal.  - If you experience any rectal bleeding (not spotting), persistent tenderness or sharp severe abdominal pains, oral temperature over 100 degrees Fahrenheit, light-headedness or dizziness, or any other problems, contact your doctor.    **If unable to reach your doctor, please go to the [facility] Emergency Room**    - Your referring physician will receive a full report of your examination.  - If you do not hear from your doctor's office within two weeks of your biopsy, please call them for your results.    You may be able to see your laboratory results in [employer] [facility] between 4 and 7 business days.  In some cases, your physician may not have viewed the results before they are released to [employer] [facility].  If you have questions regarding your results contact the physician who ordered the test/exam by phone or via [employer] by choosing \"Ask a Medical Question.\"

## 2025-03-06 NOTE — ANESTHESIA POSTPROCEDURE EVALUATION
Patient: Mary Bowden    Procedure Summary       Date: 03/06/25 Room / Location: [facility] ENDOSCOPY 05 / EM ENDOSCOPY    Anesthesia Start: 0925 Anesthesia Stop: 0951    Procedure: COLONOSCOPY Diagnosis:       Screen for colon cancer      (polyp)    Surgeons: Tonie Salas MD Anesthesiologist: Lisa Romero CRNA    Anesthesia Type: MAC ASA Status: 1            Anesthesia Type: MAC    Vitals Value Taken Time   /62 03/06/25 0950   Temp 98 °F (36.7 °C) 03/06/25 0950   Pulse 84 03/06/25 0950   Resp 16 03/06/25 0950   SpO2 100 % 03/06/25 0950       [facility] AN Post Evaluation:   Patient Evaluated in PACU  Patient Participation: complete - patient participated  Level of Consciousness: awake  Pain Score: 0  Pain Management: adequate  Airway Patency:  Dental exam unchanged from preop  Yes    Nausea/Vomiting: none  Cardiovascular Status: acceptable  Respiratory Status: acceptable  Postoperative Hydration acceptable      Lisa Romero CRNA  3/6/2025 9:51 AM

## 2025-03-06 NOTE — H&P
History & Physical Examination    Patient Name: Mary Bowden  MRN: [id number]  CSN: [id number]  YOB: 1980    Diagnosis: crc screening    Prescriptions Prior to Admission[1]  Current Facility-Administered Medications   Medication Dose Route Frequency    lactated ringers infusion   Intravenous Continuous    lactated ringers infusion   Intravenous Continuous    naloxone (Narcan) 0.4 MG/ML injection 0.08 mg  0.08 mg Intravenous Once PRN    lactated ringers infusion   Intravenous Continuous       Allergies: Allergies[2]    Past Medical History:    Allergic rhinitis    Compound nevus    back and axilla    Dermatofibroma    right leg    Dysplastic nevus    Left lower abdomen    Hx of motion sickness    Recurrent UTI (urinary tract infection)     Past Surgical History:   Procedure Laterality Date          13 hr labor  36 week 5 lb(s) 3 oz Male \"Merrick\"  JLK  PIH, A1GDM      2010    CAP ([facility])      2012      APGAR:9 (1 min) 9 (5 min)     Family History   Problem Relation Age of Onset    Hypertension Mother     Breast Cancer Mother 69    Melanoma Mother             Cancer Mother     Other (Other) Father 46        mva death.    Breast Cancer Sister 47    Diabetes Maternal Grandmother     Arthritis Maternal Grandmother     Breast Cancer Paternal Aunt 55         from complications of breast cancer    Breast Cancer Paternal Aunt 55         from complications of breast cancer     Social History     Tobacco Use    Smoking status: Former     Current packs/day: 0.00     Average packs/day: 0.3 packs/day for 8.0 years (2.0 ttl pk-yrs)     Types: Cigarettes     Start date: 6/15/1998     Quit date: 6/15/2006     Years since quittin.7     Passive exposure: Never    Smokeless tobacco: Never    Tobacco comments:     quit 2006   Substance Use Topics    Alcohol use: Yes     Comment: OCC         SYSTEM Check if Review is Normal Check if Physical Exam is Normal If not normal, please  explain:   HEENT [X ] [ X]    NECK  [X ] [ X]    HEART [X ] [ X]    LUNGS [X ] [ X]    ABDOMEN [X ] [ X]    EXTREMITIES [X ] [ X]    OTHER        I have discussed the risks and benefits and alternatives of the procedure with the patient/family.  They understand and agree to proceed with plan of care.   I have reviewed the History and Physical done within the last 30 days.  Any changes noted above.    Tonie Salas MD  [facility] - Gastroenterology  3/6/2025  9:59 AM               [1]   Facility-Administered Medications Prior to Admission   Medication Dose Route Frequency Provider Last Rate Last Admin    Levonorgestrel IUD 20 mcg  1 Device Intrauterine Once Teresa Downing MD         Medications Prior to Admission   Medication Sig Dispense Refill Last Dose/Taking    fluticasone propionate 50 MCG/ACT Nasal Suspension as needed.       Cetirizine HCl 5 MG Oral Tab Take 1 tablet (5 mg total) by mouth daily as needed.      [2] No Known Allergies

## 2025-03-06 NOTE — ANESTHESIA PREPROCEDURE EVALUATION
Anesthesia PreOp Note    HPI:     Mary Bowden is a 44 year old female who presents for preoperative consultation requested by: Tonie Salas MD    Date of Surgery: 3/6/2025    Procedure(s):  COLONOSCOPY  Indication: Screen for colon cancer    Relevant Problems   No relevant active problems       NPO:  Last Liquid Consumption Date: 25  Last Liquid Consumption Time: 0600  Last Solid Consumption Date: 25  Last Solid Consumption Time: 0700  Last Liquid Consumption Date: 25          History Review:  Patient Active Problem List    Diagnosis Date Noted    Family history of breast cancer 2022    IUD (intrauterine device) in place     Benign neoplasm of skin of trunk 10/19/2013    Benign neoplasm of scalp and skin of neck 10/19/2013       Past Medical History:    Allergic rhinitis    Compound nevus    back and axilla    Dermatofibroma    right leg    Dysplastic nevus    Left lower abdomen    Hx of motion sickness    Recurrent UTI (urinary tract infection)       Past Surgical History:   Procedure Laterality Date          13 hr labor  36 week 5 lb(s) 3 oz Male \"Merrick\"  JLK  PIH, A1GDM      2010    CAP ([facility])      2012      APGAR:9 (1 min) 9 (5 min)       Prescriptions Prior to Admission[1]  Current Medications and Prescriptions Ordered in Epic[2]    Allergies[3]    Family History   Problem Relation Age of Onset    Hypertension Mother     Breast Cancer Mother 69    Melanoma Mother             Cancer Mother     Other (Other) Father 46        mva death.    Breast Cancer Sister 47    Diabetes Maternal Grandmother     Arthritis Maternal Grandmother     Breast Cancer Paternal Aunt 55         from complications of breast cancer    Breast Cancer Paternal Aunt 55         from complications of breast cancer     Social History     Socioeconomic History    Marital status:    Tobacco Use    Smoking status: Former     Current packs/day: 0.00     Average packs/day:  0.3 packs/day for 8.0 years (2.0 ttl pk-yrs)     Types: Cigarettes     Start date: 6/15/1998     Quit date: 6/15/2006     Years since quittin.7     Passive exposure: Never    Smokeless tobacco: Never    Tobacco comments:     quit 2006   Vaping Use    Vaping status: Never Used   Substance and Sexual Activity    Alcohol use: Yes     Comment: OCC    Drug use: No    Sexual activity: Yes     Partners: Male     Birth control/protection: Mirena, I.U.D.   Other Topics Concern    Caffeine Concern Yes     Comment: coffee 1 cup daily    Exercise Yes    Grew up on a farm No    History of tanning Yes    Outdoor occupation No    Breast feeding No    Reaction to local anesthetic No    Pt has a pacemaker No    Pt has a defibrillator No       Available pre-op labs reviewed.  Lab Results   Component Value Date    WBC 5.0 2024    RBC 4.43 2024    HGB 13.2 2024    HCT 40.1 2024    MCV 90.5 2024    MCH 29.8 2024    MCHC 32.9 2024    RDW 11.9 2024    .0 2024    URINEPREG Negative 2025     Lab Results   Component Value Date     2024    K 4.3 2024     2024    CO2 30.0 2024    BUN 12 2024    CREATSERUM 0.83 2024    GLU 88 2024    CA 9.8 2024          Vital Signs:  Body mass index is 23.17 kg/m².   height is 1.626 m (5' 4\") and weight is 61.2 kg (135 lb). Her pulse is 70. Her respiration is 18 and oxygen saturation is 100%.   Vitals:    25 1210 25 0844   Pulse:  70   Resp:  18   SpO2:  100%   Weight: 61.2 kg (135 lb) 61.2 kg (135 lb)   Height: 1.626 m (5' 4\") 1.626 m (5' 4\")        Anesthesia Evaluation     Patient summary reviewed and Nursing notes reviewed    Airway   Mallampati: II  TM distance: >3 FB  Neck ROM: full  Dental - Dentition appears grossly intact     Pulmonary - negative ROS and normal exam     ROS comment: Hx smoking  Cardiovascular - negative ROS and normal exam    Neuro/Psych -  negative ROS     GI/Hepatic/Renal - negative ROS     Endo/Other - negative ROS   Abdominal  - normal exam                 Anesthesia Plan:   ASA:  1  Plan:   MAC  Informed Consent Plan and Risks Discussed With:  Patient  Use of Blood Products Discussed With:  Patient      I have informed Mary Bowden and/or legal guardian or family member of the nature of the anesthetic plan, benefits, risks including possible dental damage if relevant, major complications, and any alternative forms of anesthetic management.   All of the patient's questions were answered to the best of my ability. The patient desires the anesthetic management as planned.  Lisa Romero CRNA  3/6/2025 9:14 AM  Present on Admission:  **None**           [1]   Facility-Administered Medications Prior to Admission   Medication Dose Route Frequency Provider Last Rate Last Admin    Levonorgestrel IUD 20 mcg  1 Device Intrauterine Once Teresa Downing MD         Medications Prior to Admission   Medication Sig Dispense Refill Last Dose/Taking    fluticasone propionate 50 MCG/ACT Nasal Suspension as needed.       Cetirizine HCl 5 MG Oral Tab Take 1 tablet (5 mg total) by mouth daily as needed.      [2]   Current Facility-Administered Medications Ordered in Epic   Medication Dose Route Frequency Provider Last Rate Last Admin    lactated ringers infusion   Intravenous Continuous Tonie Salas MD         No current [facility]-ordered outpatient medications on file.   [3] No Known Allergies     18

## 2025-03-06 NOTE — OPERATIVE REPORT
COLONOSCOPY REPORT    Mary Bowden     1980 Age 44 year old   PCP Alexander Mckeon DO Endoscopist Tonie Salas MD     Date of procedure: 25    Procedure: Colonoscopy w/biopsy    Pre-operative diagnosis: screening    Post-operative diagnosis: see impression    Medications: MAC    Withdrawal time: 14 minutes    Procedure:  Informed consent was obtained from the patient after the risks of the procedure were discussed, including but not limited to bleeding, perforation, aspiration, infection, or possibility of a missed lesion. After discussions of the risks/benefits and alternatives to this procedure, as well as the planned sedation, the patient was placed in the left lateral decubitus position and begun on continuous blood pressure pulse oximetry and EKG monitoring and this was maintained throughout the procedure. Once an adequate level of sedation was obtained a digital rectal exam was completed. Then the lubricated tip of the [id number] diagnostic video pediatric colonoscope was inserted and advanced without difficulty to the cecum using the CO2 insufflation technique. The cecum was identified by localizing the trifold, the appendix and the ileocecal valve. Withdrawal was begun with thorough washing and careful examination of the colonic walls and folds. A routine second examination of the cecum/ascending colon was performed. Retroflexion was performed in the rectum. Photodocumentation was obtained. [place] bowel prep score of 9 (Right colon-3; Transverse colon-3, Left colon-3). I then carefully withdrew the instrument from the patient who tolerated the procedure well.     Complications: none.    Findings:   -- HEATHER: normal rectal tone, no masses palpated.     -- 1 polyp(s) noted as follows:      A. 2 mm polyp in the cecum; sessile morphology; cold forceps polypectomy and retrieved.    -- A retroflexed view of the rectum revealed no abnormalities.    -- The colonic mucosa throughout the colon showed  normal vascular pattern, without evidence of angioectasias or inflammation.     Impression:   One diminutive polyp resected and retrieved    Recommend:  Pending pathology, repeat colonoscopy in 7-10 years.    >>>If tissue was obtained and you have not received your pathology results either by phone or letter within 2 weeks, please call our office at [phone].    Specimens: polyp    Blood loss: <1 ml      Tonie Salas MD  [facility]

## 2025-03-07 ENCOUNTER — TELEPHONE (OUTPATIENT)
Facility: CLINIC | Age: 45
End: 2025-03-07

## 2025-03-07 NOTE — PROGRESS NOTES
Dear Mary,    I reviewed the pathology report from the polyp(s) we completely removed during your recent colonoscopy. The polyp removed was an adenoma, which is a benign growth. However, these are the types of polyps that if not removed could become a colon cancer. All of your polyps were completely removed.     The current health care guidelines recommend that patients with the size, number, and types of polyps you have should repeat their colonoscopy in 7 years to look for any new polyps that might have grown.    If you have further questions please call me at [phone] or message me through [employer].    Sincerely,   Tonie Salas MD

## 2025-03-07 NOTE — TELEPHONE ENCOUNTER
----- Message from Tonie Michael Ma sent at 3/7/2025  1:36 PM CST -----  Dear Mary,    I reviewed the pathology report from the polyp(s) we completely removed during your recent colonoscopy. The polyp removed was an adenoma, which is a benign growth. However, these are the types of polyps that if not removed could become a colon cancer. All of your polyps were completely removed.     The current health care guidelines recommend that patients with the size, number, and types of polyps you have should repeat their colonoscopy in 7 years to look for any new polyps that might have grown.    If you have further questions please call me at [phone] or message me through [employer].    Sincerely,   Tonie Salas MD

## 2025-03-07 NOTE — TELEPHONE ENCOUNTER
Health Maintenance Updated.    7 year colonoscopy recall entered into patient outreach in [facility].  Next colonoscopy will be due 3/6/2032.

## 2025-03-17 ENCOUNTER — OFFICE VISIT (OUTPATIENT)
Dept: DERMATOLOGY CLINIC | Facility: CLINIC | Age: 45
End: 2025-03-17

## 2025-03-17 DIAGNOSIS — Z87.898 HISTORY OF ATYPICAL NEVUS: ICD-10-CM

## 2025-03-17 DIAGNOSIS — Z13.89 ENCOUNTER FOR SURVEILLANCE OF ABNORMAL NEVI: ICD-10-CM

## 2025-03-17 DIAGNOSIS — L81.4 LENTIGO: ICD-10-CM

## 2025-03-17 DIAGNOSIS — D23.9 BENIGN NEOPLASM OF SKIN, UNSPECIFIED LOCATION: ICD-10-CM

## 2025-03-17 DIAGNOSIS — Z80.8 FAMILY HISTORY OF MELANOMA: ICD-10-CM

## 2025-03-17 DIAGNOSIS — D22.9 MULTIPLE NEVI: Primary | ICD-10-CM

## 2025-03-17 PROCEDURE — 99213 OFFICE O/P EST LOW 20 MIN: CPT | Performed by: DERMATOLOGY

## 2025-03-17 NOTE — PROGRESS NOTES
The following individual(s) verbally consented to be recorded using ambient AI listening technology and understand that they can each withdraw their consent to this listening technology at any point by asking the clinician to turn off or pause the recording:    Patient name: Mary Bowden  Additional names:

## 2025-03-23 NOTE — PROGRESS NOTES
Mary Bowden is a 44 year old female.  HPI:     CC:    Chief Complaint   Patient presents with    Full Skin Exam     LOV 24  Has family Hx of melanoma(Mother)  Pt presents for FBSE.  Denies any concerns at this time.         Allergies:  Patient has no known allergies.    HISTORY:    Past Medical History:    Allergic rhinitis    Compound nevus    back and axilla    Dermatofibroma    right leg    Dysplastic nevus    Left lower abdomen    Hx of motion sickness    Recurrent UTI (urinary tract infection)      Past Surgical History:   Procedure Laterality Date    Colonoscopy N/A 3/6/2025    Procedure: COLONOSCOPY;  Surgeon: Tonie Salas MD;  Location: [facility] ENDOSCOPY          13 hr labor  36 week 5 lb(s) 3 oz Male \"Merrick\"  JLK  PIH, A1GDM      2010    CAP ([place])      2012      APGAR:9 (1 min) 9 (5 min)      Family History   Problem Relation Age of Onset    Hypertension Mother     Breast Cancer Mother 69    Melanoma Mother             Cancer Mother     Other (Other) Father 46        mva death.    Breast Cancer Sister 47    Diabetes Maternal Grandmother     Arthritis Maternal Grandmother     Breast Cancer Paternal Aunt 55         from complications of breast cancer    Breast Cancer Paternal Aunt 55         from complications of breast cancer      Social History     Socioeconomic History    Marital status:    Tobacco Use    Smoking status: Former     Current packs/day: 0.00     Average packs/day: 0.3 packs/day for 8.0 years (2.0 ttl pk-yrs)     Types: Cigarettes     Start date: 6/15/1998     Quit date: 6/15/2006     Years since quittin.7     Passive exposure: Never    Smokeless tobacco: Never    Tobacco comments:     quit 2006   Vaping Use    Vaping status: Never Used   Substance and Sexual Activity    Alcohol use: Yes     Comment: OCC    Drug use: No    Sexual activity: Yes     Partners: Male     Birth control/protection: Mirena, I.U.D.   Other Topics Concern     Caffeine Concern Yes     Comment: coffee 1 cup daily    Exercise Yes    Grew up on a farm No    History of tanning Yes    Outdoor occupation No    Breast feeding No    Reaction to local anesthetic No    Pt has a pacemaker No    Pt has a defibrillator No        Current Outpatient Medications   Medication Sig Dispense Refill    fluticasone propionate 50 MCG/ACT Nasal Suspension as needed.      Cetirizine HCl 5 MG Oral Tab Take 1 tablet (5 mg total) by mouth daily as needed.       Allergies:   No Known Allergies    Past Medical History:    Allergic rhinitis    Compound nevus    back and axilla    Dermatofibroma    right leg    Dysplastic nevus    Left lower abdomen    Hx of motion sickness    Recurrent UTI (urinary tract infection)     Past Surgical History:   Procedure Laterality Date    Colonoscopy N/A 3/6/2025    Procedure: COLONOSCOPY;  Surgeon: Tonie Salas MD;  Location: [facility] ENDOSCOPY    [facility]      13 hr labor [facility] 36 week 5 lb(s) 3 oz Male \"Merrick\"  JLK  PIH, A1GDM      2010    CAP ([place])      2012      APGAR:9 (1 min) 9 (5 min)     Social History     Socioeconomic History    Marital status:      Spouse name: Not on file    Number of children: Not on file    Years of education: Not on file    Highest education level: Not on file   Occupational History    Not on file   Tobacco Use    Smoking status: Former     Current packs/day: 0.00     Average packs/day: 0.3 packs/day for 8.0 years (2.0 ttl pk-yrs)     Types: Cigarettes     Start date: 6/15/1998     Quit date: 6/15/2006     Years since quittin.7     Passive exposure: Never    Smokeless tobacco: Never    Tobacco comments:     quit 2006   Vaping Use    Vaping status: Never Used   Substance and Sexual Activity    Alcohol use: Yes     Comment: OCC    Drug use: No    Sexual activity: Yes     Partners: Male     Birth control/protection: Mirena, I.U.D.   Other Topics Concern     Service Not Asked    Blood Transfusions Not  Asked    Caffeine Concern Yes     Comment: coffee 1 cup daily    Occupational Exposure Not Asked    Hobby Hazards Not Asked    Sleep Concern Not Asked    Stress Concern Not Asked    Weight Concern Not Asked    Special Diet Not Asked    Back Care Not Asked    Exercise Yes    Bike Helmet Not Asked    Seat Belt Not Asked    Self-Exams Not Asked    Grew up on a farm No    History of tanning Yes    Outdoor occupation No    Breast feeding No    Reaction to local anesthetic No    Pt has a pacemaker No    Pt has a defibrillator No   Social History Narrative    Not on file     Social Drivers of Health     Food Insecurity: Not on file   Transportation Needs: Not on file   Stress: Not on file   Housing Stability: Not on file     Family History   Problem Relation Age of Onset    Hypertension Mother     Breast Cancer Mother 69    Melanoma Mother             Cancer Mother     Other (Other) Father 46        mva death.    Breast Cancer Sister 47    Diabetes Maternal Grandmother     Arthritis Maternal Grandmother     Breast Cancer Paternal Aunt 55         from complications of breast cancer    Breast Cancer Paternal Aunt 55         from complications of breast cancer       There were no vitals filed for this visit.    HPI:    Chief Complaint   Patient presents with    Full Skin Exam     LOV 24  Has family Hx of melanoma(Mother)  Pt presents for FBSE.  Denies any concerns at this time.     Follow-up skin exam patient with history of atypical nevi.  No particular lesions of concern at this time.  No changing pigmented lesions noted by patient    Patient's mother with history of melanoma.    Patient with history of excessive sun exposure, tanning bed remotely.  Now uses sunscreen consistently    Patient presents with concerns above.    Patient has been in their usual state of health.  History, medications, allergies reviewed as noted.      ROS:  Denies any other systemic complaints.  No new or changeing lesions other  than noted above. No fevers, chills, night sweats, unusual sun sensitivity.  No other skin complaints.        History, medications, allergies reviewed as noted.       Physical Examination:     Well-developed well-nourished patient alert oriented in no acute distress.  Exam total-body performed, including scalp, head, neck, face,nails, hair, external eyes, including conjunctival mucosa, eyelids, lips external ears, back, chest,/ breasts, axillae,  abdomen, arms, legs, palms.     Multiple light to medium brown, well marginated, uniformly pigmented, macules and papules 6 mm and less scattered on exam. pigmented lesions examined with dermoscopy benign-appearing patterns.     Waxy tannish keratotic papules scattered, cherry-red vascular papules scattered.    See map today's date for lesions noted .      Otherwise remarkable for lesions as noted on map.  See details of examination  See Assessment /Plan for additional history and physical exam also:    Assessment / plan:    No orders of the defined types were placed in this encounter.      Meds & Refills for this Visit:  Requested Prescriptions      No prescriptions requested or ordered in this encounter         Encounter Diagnoses   Name Primary?    Multiple nevi Yes    History of atypical nevus     Lentigo     Family history of melanoma     Encounter for surveillance of abnormal nevi     Benign neoplasm of skin, unspecified location        See details on map.      Remarkable for:    Patient seen for follow-up long-term monitoring, treatment of  Atypical nevi, family history melanoma  Plan of care:  ongoing surveillance, monitoring including regular follow-up due to longer term risk of recurrence, new lesions.  See previous notes.  There is a longitudinal care relationship with me, the care plan reflects the ongoing nature of the continuous relationship of care, and the medical record indicates that there is ongoing treatment of a serious/complex medical condition which I  am currently managing.  is Applicable      Physical Exam  SKIN: Age spot on skin, appears stable. Multiple lesions on chest, appear benign. Moles appear benign, with the thigh mole unchanged. Lesion likely represents keratosis, monitoring advised.    Results        Assessment & Plan  Multiple nevi  Nevi on the chest and thigh are well-managed with no significant changes. The chest lesion may be an age spot, and the thigh lesion is likely a keratosis. Nevi on the back are difficult to monitor due to her location.  - Capture an image of the nevus on the thigh for future comparison.    Lentigo  The lesion on the left hip is likely a lentigo, appearing uniform and light upon magnification. It is not concerning at this time.  - Monitor the lentigo on the left hip for any changes.      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  -The atypical nevus appears narrowly excised in the examined planes of section    More frequent follow-up given history of dysplastic nevi every 4-5 months continue  SKs skin tags along bra line, shoulders reassurance    Right tan-brown 3 mm papule slightly darker periphery at right forearm recommend observation.  Extensive junctional nevi benign-appearing nevi benign patterns on dermoscopy lesion remains stable  Abdomen lesion stable    HSV as needed flare Valtrex 2 tabs, 12 hours later 2 tabs.  Flare let us know if worsening symptoms consider longer course  Acne lesions intermittent chin jawline.  Tretinoin.  Application instructions reviewed  Retinoids: Application instructions reviewed gentle cleanser, over moisturizer.  Hyaluronic acid continue moisturizers may be helpful at reducing irritation.  Start 2-3 times weekly slowly titrate up, increase to nightly as tolerated May need to decrease use  with cold weather.      Biopsy left upper back atypical lentiginous nevus with mild dysplasia-excised 6/22  No recurrence.  Overall doing well   left anterior axilla,compound nevus right lateral lower leg Dermatofibroma 6/22      History of lentigines, melasma stable.  Continue sun protection over-the-counter retinol  Discussed tretinoin for chemoprevention,also      Follow-up more frequently if lesions are atypical.  Ideally q. 4 to 6 months  Extensive nevi, many junctional nevi over the chest back arms.  In particular multiple lesions over the lumbar back CMAP observe.    Dermal papular nodule consistent with dermatofibroma at right lateral leg.  Multiple nevi over the toes feet.  Recommend observation.    Lesion at left mid back 4 x 6 mm with slightly retiform border.  Observe.  Darker focus at inferior and central border of lesion.  Benign pattern on dermoscopy careful monitoring of this lesion in particular lesion appears unchanged    Extensive sun damage encourage sunscreen sun protection regular follow-up ideally recheck lesion in 4 months  Please refer to map for specific lesions.  See additional diagnoses.  Pros cons of various therapies, risks benefits discussed.Pathophysiology discussed with patient.  Therapeutic options reviewed.  See  Medications in grid.  Instructions reviewed at length.    Benign nevi, seborrheic  keratoses, cherry angiomas:  Reassurance regarding other benign skin lesions.    General skin care questions answered.   Reassurance regarding benign skin lesions.    Monitor for new or changing lesions. Signs and symptoms of skin cancer, ABCDE's of melanoma ( additional information available at AAD.org, skincancer.org) Encourage Sunscreen (broad-spectrum, ideally mineral-based-UVA/UVB -SPF 30 or higher) use encouraged, sun protection/sun protective clothing, self exams reviewed Followup as noted RTC ---routine checkup 6 mos -one year or p.r.n.    Encounter Times   Including precharting,  reviewing chart, prior notes obtaining history: 10 minutes, medical exam :10 minutes, notes on body map, plan, counseling 10minutes My total time spent caring for the patient on the day of the encounter: 30 minutes     The patient indicates understanding of these issues and agrees to the plan.  The patient is asked to return as noted in follow-up/ above.    This note was generated using Dragon voice recognition software.  Please contact me regarding any confusion resulting from errors in recognition..  Note to patient and family: The 21st Century Cures Act makes medical notes like these available to patients. However, be advised this is a medical document. It is intended as [id number] communication and monitoring of a patient's care needs. It is written in medical language and may contain abbreviations or verbiage that are unfamiliar. It may appear blunt or direct. Medical documents are intended to carry relevant information, facts as evident and the clinical opinion of the practitioner.

## 2025-05-27 ENCOUNTER — HOSPITAL ENCOUNTER (OUTPATIENT)
Dept: MAMMOGRAPHY | Age: 45
Discharge: HOME | End: 2025-05-27
Attending: OBSTETRICS & GYNECOLOGY
Payer: COMMERCIAL

## 2025-05-27 DIAGNOSIS — Z12.31 ENCOUNTER FOR SCREENING MAMMOGRAM FOR MALIGNANT NEOPLASM OF BREAST: ICD-10-CM

## 2025-05-27 PROCEDURE — 77063 BREAST TOMOSYNTHESIS BI: CPT | Performed by: OBSTETRICS & GYNECOLOGY

## 2025-05-27 PROCEDURE — 77067 SCR MAMMO BI INCL CAD: CPT | Performed by: OBSTETRICS & GYNECOLOGY

## 2025-08-21 ENCOUNTER — OFFICE VISIT (OUTPATIENT)
Dept: FAMILY MEDICINE CLINIC | Facility: CLINIC | Age: 45
End: 2025-08-21

## 2025-08-21 VITALS
HEART RATE: 65 BPM | DIASTOLIC BLOOD PRESSURE: 77 MMHG | WEIGHT: 136.63 LBS | HEIGHT: 64 IN | BODY MASS INDEX: 23.32 KG/M2 | SYSTOLIC BLOOD PRESSURE: 119 MMHG

## 2025-08-21 DIAGNOSIS — J02.9 SORE THROAT: Primary | ICD-10-CM

## 2025-08-21 DIAGNOSIS — Z80.3 FAMILY HISTORY OF BREAST CANCER: ICD-10-CM

## 2025-08-21 PROCEDURE — 87880 STREP A ASSAY W/OPTIC: CPT | Performed by: FAMILY MEDICINE

## 2025-08-21 PROCEDURE — 99213 OFFICE O/P EST LOW 20 MIN: CPT | Performed by: FAMILY MEDICINE

## 2025-08-21 RX ORDER — PENICILLIN V POTASSIUM 500 MG/1
500 TABLET, FILM COATED ORAL 3 TIMES DAILY
Qty: 30 TABLET | Refills: 0 | Status: SHIPPED | OUTPATIENT
Start: 2025-08-21 | End: 2025-08-21

## 2025-08-25 ENCOUNTER — TELEPHONE (OUTPATIENT)
Facility: LOCATION | Age: 45
End: 2025-08-25

## (undated) DIAGNOSIS — Z12.31 ENCOUNTER FOR SCREENING MAMMOGRAM FOR MALIGNANT NEOPLASM OF BREAST: Primary | ICD-10-CM

## (undated) DIAGNOSIS — Z80.8 FAMILY HISTORY OF MALIGNANT MELANOMA: Primary | ICD-10-CM

## (undated) DIAGNOSIS — D23.5 BENIGN NEOPLASM OF SKIN OF TRUNK: ICD-10-CM

## (undated) DEVICE — MEDI-VAC NON-CONDUCTIVE SUCTION TUBING 6MM X 1.8M (6FT.) L: Brand: CARDINAL HEALTH

## (undated) DEVICE — V2 SPECIMEN COLLECTION MANIFOLD KIT: Brand: NEPTUNE

## (undated) DEVICE — LASSO POLYPECTOMY SNARE: Brand: LASSO

## (undated) DEVICE — Device

## (undated) DEVICE — KIT CLEAN ENDOKIT 1.1OZ GOWNX2

## (undated) DEVICE — 60 ML SYRINGE REGULAR TIP: Brand: MONOJECT

## (undated) DEVICE — KIT ENDO ORCAPOD 160/180/190

## (undated) NOTE — LETTER
10/15/2019              Ruthy Vincent        [address] [address]         Dear Fabrice Gonzalez,      It was a pleasure to see you at our [address] [place] [address], [facility] office.   It was good to see you in the [facility]

## (undated) NOTE — LETTER
10/09/21        [place] [id number]      Dear Yelitza Labor records indicate that you have outstanding lab work and or testing that was ordered for you and has not yet been completed:  Orders Placed This Encounter      MR

## (undated) NOTE — LETTER
[facility]  Administration of Anesthesia  Mary RODRIGUEZ agree to be cared for by a physician anesthesiologist alone and/or with a nurse anesthetist, who is specially trained to monitor me and give me medicine to put me to sleep or keep me comfortable during my procedure    I understand that my anesthesiologist and/or anesthetist is not an employee or agent of [facility] or [employer]. He or she works for [facility]    As the patient asking for anesthesia services, I agree to:  Allow the anesthesiologist (anesthesia doctor) to give me medicine and do additional procedures as necessary. Some examples are: Starting or using an “IV” to give me medicine, fluids or blood during my procedure, and having a breathing tube placed to help me breathe when I’m asleep (intubation). In the event that my heart stops working properly, I understand that my anesthesiologist will make every effort to sustain my life, unless otherwise directed by [facility] Do Not Resuscitate documents.  Tell my anesthesia doctor before my procedure:  If I am pregnant.  The last time that I ate or drank.  iii. All of the medicines I take (including prescriptions, herbal supplements, and pills I can buy without a prescription (including street drugs/illegal medications). Failure to inform my anesthesiologist about these medicines may increase my risk of anesthetic complications.  iv.If I am allergic to anything or have had a reaction to anesthesia before.  I understand how the anesthesia medicine will help me (benefits).  I understand that with any type of anesthesia medicine there are risks:  The most common risks are: nausea, vomiting, sore throat, muscle soreness, damage to my eyes, mouth, or teeth (from breathing tube placement).  Rare risks include: remembering what happened during my procedure, allergic reactions to medications, injury to my airway, heart, lungs, vision, nerves, or muscles  and in extremely rare instances death.  My doctor has explained to me other choices available to me for my care (alternatives).  Pregnant Patients (“epidural”):  I understand that the risks of having an epidural (medicine given into my back to help control pain during labor), include itching, low blood pressure, difficulty urinating, headache or slowing of the baby’s heart. Very rare risks include infection, bleeding, seizure, irregular heart rhythms and nerve injury.  Regional Anesthesia (“spinal”, “epidural”, & “nerve blocks”):  I understand that rare but potential complications include headache, bleeding, infection, seizure, irregular heart rhythms, and nerve injury.    _____________________________________________________________________________  Patient (or Representative) Signature/Relationship to Patient  Date   Time    _____________________________________________________________________________   Name (if used)    Language/Organization   Time    _____________________________________________________________________________  Nurse Anesthetist Signature     Date   Time  _____________________________________________________________________________  Anesthesiologist Signature     Date   Time  I have discussed the procedure and information above with the patient (or patient’s representative) and answered their questions. The patient or their representative has agreed to have anesthesia services.    _____________________________________________________________________________  Witness        Date   Time  I have verified that the signature is that of the patient or patient’s representative, and that it was signed before the procedure  Patient Name: Mary Bowden     : 1980                 Printed: 3/6/2025 at 8:11 AM    Medical Record #: [id number]                                            Page 1 of 1  ----------ANESTHESIA CONSENT----------

## (undated) NOTE — LETTER
01/10/22        [place] [id number]      Dear Merary Gotti records indicate that you have outstanding lab work and or testing that was ordered for you and has not yet been completed:  Orders Placed This Encounter

## (undated) NOTE — LETTER
10/15/2019              Liudmila Dunlap        [address] [facility] [address]         Dear Noman Knox,      It was a pleasure to see you at our [address] [place] [address] office.  It was good to see you in the offic

## (undated) NOTE — LETTER
10/15/2019              Irene Mckeon        [address]        [place] [address]         Dear Tammy Ventura,      It was a pleasure to see you at our [address] [place] [address] office.   Your lab tests were normal.  There

## (undated) NOTE — LETTER
[facility]  Administration of Anesthesia  Mary RODRIGUEZ agree to be cared for by a physician anesthesiologist alone and/or with a nurse anesthetist, who is specially trained to monitor me and give me medicine to put me to sleep or keep me comfortable during my procedure    I understand that my anesthesiologist and/or anesthetist is not an employee or agent of [facility] or [employer]. He or she works for [facility]    As the patient asking for anesthesia services, I agree to:  Allow the anesthesiologist (anesthesia doctor) to give me medicine and do additional procedures as necessary. Some examples are: Starting or using an “IV” to give me medicine, fluids or blood during my procedure, and having a breathing tube placed to help me breathe when I’m asleep (intubation). In the event that my heart stops working properly, I understand that my anesthesiologist will make every effort to sustain my life, unless otherwise directed by [facility] Do Not Resuscitate documents.  Tell my anesthesia doctor before my procedure:  If I am pregnant.  The last time that I ate or drank.  iii. All of the medicines I take (including prescriptions, herbal supplements, and pills I can buy without a prescription (including street drugs/illegal medications). Failure to inform my anesthesiologist about these medicines may increase my risk of anesthetic complications.  iv.If I am allergic to anything or have had a reaction to anesthesia before.  I understand how the anesthesia medicine will help me (benefits).  I understand that with any type of anesthesia medicine there are risks:  The most common risks are: nausea, vomiting, sore throat, muscle soreness, damage to my eyes, mouth, or teeth (from breathing tube placement).  Rare risks include: remembering what happened during my procedure, allergic reactions to medications, injury to my airway, heart, lungs, vision, nerves, or muscles  and in extremely rare instances death.  My doctor has explained to me other choices available to me for my care (alternatives).  Pregnant Patients (“epidural”):  I understand that the risks of having an epidural (medicine given into my back to help control pain during labor), include itching, low blood pressure, difficulty urinating, headache or slowing of the baby’s heart. Very rare risks include infection, bleeding, seizure, irregular heart rhythms and nerve injury.  Regional Anesthesia (“spinal”, “epidural”, & “nerve blocks”):  I understand that rare but potential complications include headache, bleeding, infection, seizure, irregular heart rhythms, and nerve injury.    _____________________________________________________________________________  Patient (or Representative) Signature/Relationship to Patient  Date   Time    _____________________________________________________________________________   Name (if used)    Language/Organization   Time    _____________________________________________________________________________  Nurse Anesthetist Signature     Date   Time  _____________________________________________________________________________  Anesthesiologist Signature     Date   Time  I have discussed the procedure and information above with the patient (or patient’s representative) and answered their questions. The patient or their representative has agreed to have anesthesia services.    _____________________________________________________________________________  Witness        Date   Time  I have verified that the signature is that of the patient or patient’s representative, and that it was signed before the procedure  Patient Name: Mary Bowden     : 1980                 Printed: 3/3/2025 at 11:02 AM    Medical Record #: [id number]                                            Page 1 of 1  ----------ANESTHESIA CONSENT----------

## (undated) NOTE — LETTER
AUTHORIZATION FOR SURGICAL OPERATION OR OTHER PROCEDURE    1.  I hereby authorize Dr. Marcelina Lares, and [facility] [facility] staff assigned to my case to perform the following operation and/or procedure at the [facility]:    IUD REMOVAL AND IUD INSERTION ___ Time:  ________ A. M.  P.M.        Patient Name:  ______________________________________________________  (please print)      Patient signature:  ___________________________________________________             Relationship to Patient:

## (undated) NOTE — LETTER
[address], [place], [place]    Authorization for Surgical Operation and Procedure                               I hereby authorize Tonie Salas MD, my physician and his/her assistants (if applicable), which may include medical students, residents, and/or fellows, to perform the following surgical operation/ procedure and administer such anesthesia as may be determined necessary by my physician: Operation/Procedure name (s) COLONOSCOPY on Mary Bowden   2.   I recognize that during the surgical operation/procedure, unforeseen conditions may necessitate additional or different procedures than those listed above.  I, therefore, further authorize and request that the above-named surgeon, assistants, or designees perform such procedures as are, in their judgment, necessary and desirable.    3.   My surgeon/physician has discussed prior to my surgery the potential benefits, risks and side effects of this procedure; the likelihood of achieving goals; and potential problems that might occur during recuperation.  They also discussed reasonable alternatives to the procedure, including risks, benefits, and side effects related to the alternatives and risks related to not receiving this procedure.  I have had all my questions answered and I acknowledge that no guarantee has been made as to the result that may be obtained.    4.   Should the need arise during my operation/procedure, which includes change of level of care prior to discharge, I also consent to the administration of blood and/or blood products.  Further, I understand that despite careful testing and screening of blood or blood products by collecting agencies, I may still be subject to ill effects as a result of receiving a blood transfusion and/or blood products.  The following are some, but not all, of the potential risks that can occur: fever and allergic reactions, hemolytic reactions, transmission of diseases such as  Hepatitis, AIDS and Cytomegalovirus (CMV) and fluid overload.  In the event that I wish to have an autologous transfusion of my own blood, or a directed donor transfusion, I will discuss this with my physician.  Check only if Refusing Blood or Blood Products  I understand refusal of blood or blood products as deemed necessary by my physician may have serious consequences to my condition to include possible death. I hereby assume responsibility for my refusal and release the hospital, its personnel, and my physicians from any responsibility for the consequences of my refusal.    o  Refuse   5.   I authorize the use of any specimen, organs, tissues, body parts or foreign objects that may be removed from my body during the operation/procedure for diagnosis, research or teaching purposes and their subsequent disposal by hospital authorities.  I also authorize the release of specimen test results and/or written reports to my treating physician on the hospital medical staff or other referring or consulting physicians involved in my care, at the discretion of the Pathologist or my treating physician.    6.   I consent to the photographing or videotaping of the operations or procedures to be performed, including appropriate portions of my body for medical, scientific, or educational purposes, provided my identity is not revealed by the pictures or by descriptive texts accompanying them.  If the procedure has been photographed/videotaped, the surgeon will obtain the original picture, image, videotape or CD.  The hospital will not be responsible for storage, release or maintenance of the picture, image, tape or CD.    7.   I consent to the presence of a  or observers in the operating room as deemed necessary by my physician or their designees.    8.   I recognize that in the event my procedure results in extended X-Ray/fluoroscopy time, I may develop a skin reaction.    9. If I have a Do Not Attempt  Resuscitation (DNAR) order in place, that status will be suspended while in the operating room, procedural suite, and during the recovery period unless otherwise explicitly stated by me (or a person authorized to consent on my behalf). The surgeon or my attending physician will determine when the applicable recovery period ends for purposes of reinstating the DNAR order.  10. Patients having a sterilization procedure: I understand that if the procedure is successful the results will be permanent and it will therefore be impossible for me to inseminate, conceive, or bear children.  I also understand that the procedure is intended to result in sterility, although the result has not been guaranteed.   11. I acknowledge that my physician has explained sedation/analgesia administration to me including the risk and benefits I consent to the administration of sedation/analgesia as may be necessary or desirable in the judgment of my physician.    I CERTIFY THAT I HAVE READ AND FULLY UNDERSTAND THE ABOVE CONSENT TO OPERATION and/or OTHER PROCEDURE.     ____________________________________  _________________________________        ______________________________  Signature of Patient    Signature of Responsible Person                Printed Name of Responsible Person                                      ____________________________________  _____________________________                ________________________________  Signature of Witness        Date  Time         Relationship to Patient    STATEMENT OF PHYSICIAN My signature below affirms that prior to the time of the procedure; I have explained to the patient and/or his/her legal representative, the risks and benefits involved in the proposed treatment and any reasonable alternative to the proposed treatment. I have also explained the risks and benefits involved in refusal of the proposed treatment and alternatives to the proposed treatment and have answered the patient's  questions. If I have a significant financial interest in a co-management agreement or a significant financial interest in any product or implant, or other significant relationship used in this procedure/surgery, I have disclosed this and had a discussion with my patient.     _____________________________________________________              _____________________________  (Signature of Physician)                                                                                         (Date)                                   (Time)  Patient Name: Mary Bowden      : 1980      Printed: 3/3/2025     Medical Record #: [id number]                                      Page 1 of 1

## (undated) NOTE — LETTER
05/10/21        Carol Harrison [id number]      Dear Deirdre Castillo records indicate that you have outstanding lab work and or testing that was ordered for you and has not yet been completed:  Orders Placed This Encounter      Marco A Rizo

## (undated) NOTE — MR AVS SNAPSHOT
After Visit Summary   10/9/2019    Corazon Bennett    MRN: [id number]           Visit Information     Date & Time  10/9/2019 11:20 AM Provider  Chato Kern MD [address], [address] [facility] Dept.  Phone  [phone]      Your [employer] 2D+3D SCREENING BILAT (CPT=77067/06088)    Instructions:   To schedule a test at any [facility], call Central Scheduling at [phone], Monday through Friday between 7:30am to 6pm and on Saturday between 8am and 1pm. 4. Enter your Zip Code and Date of Birth (mm/dd/yyyy) as indicated and click Next. You will be taken to the next sign-up page. 5. Create a MyChart Username.  This will be your [employer] login Username and cannot be changed, so think of one that is secure and Average cost  $70*       VIDEO VISITS  Visit face-to-face with a [facility] physician or [facility]  using your mobile device or computer   using [employer]      e-VISITS  Communicate with a [facility] physician or [facility]  online.   The physician will respond and provide a treatment

## (undated) NOTE — ED AVS SNAPSHOT
[facility] in [facility] [address]  [address] [facility]    Phone:  [phone]    Fax:  [phone]           Juan Adam   MRN: [id number]    Department:  [facility] in [address] [place] [address]   Date of Visit:  1/ instructed with your primary care doctor, please return to immediate care.      Discharge References/Attachments     SINUSITIS (ANTIBIOTIC TREATMENT) (ENGLISH)      Disclosure     Insurance plans vary and the physician(s) referred by the Immediate Care may Registration line at [phone] or find a doctor online by visiting [email or web address].    IF THERE IS ANY CHANGE OR WORSENING OF YOUR CONDITION, CALL YOUR PRIMARY CARE PHYSICIAN AT ONCE OR GO TO [address] [place] [address].     If you have been prescribed a - If you have concerns related to behavioral health issues or thoughts of harming yourself, contact [address] [place] [address] at [phone].     - If you don’t have insurance, Evgeny Holland has partnered with [employer]